# Patient Record
Sex: MALE | Race: WHITE | Employment: OTHER | URBAN - NONMETROPOLITAN AREA
[De-identification: names, ages, dates, MRNs, and addresses within clinical notes are randomized per-mention and may not be internally consistent; named-entity substitution may affect disease eponyms.]

---

## 2017-02-20 DIAGNOSIS — R53.1 WEAKNESS: ICD-10-CM

## 2017-03-31 RX ORDER — TAMSULOSIN HYDROCHLORIDE 0.4 MG/1
CAPSULE ORAL
Qty: 30 CAPSULE | Refills: 5 | Status: SHIPPED | OUTPATIENT
Start: 2017-03-31 | End: 2017-08-25 | Stop reason: SDUPTHER

## 2017-05-08 ENCOUNTER — OFFICE VISIT (OUTPATIENT)
Dept: FAMILY MEDICINE CLINIC | Age: 82
End: 2017-05-08
Payer: MEDICARE

## 2017-05-08 VITALS
RESPIRATION RATE: 20 BRPM | WEIGHT: 146 LBS | BODY MASS INDEX: 20.9 KG/M2 | HEIGHT: 70 IN | SYSTOLIC BLOOD PRESSURE: 132 MMHG | TEMPERATURE: 98.9 F | HEART RATE: 61 BPM | DIASTOLIC BLOOD PRESSURE: 52 MMHG | OXYGEN SATURATION: 99 %

## 2017-05-08 DIAGNOSIS — I48.0 PAROXYSMAL ATRIAL FIBRILLATION (HCC): ICD-10-CM

## 2017-05-08 DIAGNOSIS — H91.93 HOH (HARD OF HEARING), BILATERAL: ICD-10-CM

## 2017-05-08 DIAGNOSIS — R55 SYNCOPE, UNSPECIFIED SYNCOPE TYPE: ICD-10-CM

## 2017-05-08 DIAGNOSIS — C44.90 SKIN CANCER: ICD-10-CM

## 2017-05-08 DIAGNOSIS — D50.9 IRON DEFICIENCY ANEMIA, UNSPECIFIED IRON DEFICIENCY ANEMIA TYPE: ICD-10-CM

## 2017-05-08 DIAGNOSIS — R42 DIZZINESS: ICD-10-CM

## 2017-05-08 DIAGNOSIS — Z92.3 S/P RADIOTHERAPY: ICD-10-CM

## 2017-05-08 PROCEDURE — 99214 OFFICE O/P EST MOD 30 MIN: CPT | Performed by: FAMILY MEDICINE

## 2017-05-08 PROCEDURE — 1036F TOBACCO NON-USER: CPT | Performed by: FAMILY MEDICINE

## 2017-05-08 PROCEDURE — G8419 CALC BMI OUT NRM PARAM NOF/U: HCPCS | Performed by: FAMILY MEDICINE

## 2017-05-08 PROCEDURE — 4040F PNEUMOC VAC/ADMIN/RCVD: CPT | Performed by: FAMILY MEDICINE

## 2017-05-08 PROCEDURE — G8427 DOCREV CUR MEDS BY ELIG CLIN: HCPCS | Performed by: FAMILY MEDICINE

## 2017-05-08 PROCEDURE — 1123F ACP DISCUSS/DSCN MKR DOCD: CPT | Performed by: FAMILY MEDICINE

## 2017-05-08 ASSESSMENT — ENCOUNTER SYMPTOMS
EYES NEGATIVE: 1
NAUSEA: 0
WHEEZING: 0
CHEST TIGHTNESS: 0
SHORTNESS OF BREATH: 0
ALLERGIC/IMMUNOLOGIC NEGATIVE: 1
BLOOD IN STOOL: 0
CONSTIPATION: 0
VOMITING: 0
DIARRHEA: 0

## 2017-05-09 ENCOUNTER — TELEPHONE (OUTPATIENT)
Dept: FAMILY MEDICINE CLINIC | Age: 82
End: 2017-05-09

## 2017-06-27 ENCOUNTER — OFFICE VISIT (OUTPATIENT)
Dept: FAMILY MEDICINE CLINIC | Age: 82
End: 2017-06-27
Payer: MEDICARE

## 2017-06-27 VITALS
SYSTOLIC BLOOD PRESSURE: 118 MMHG | HEIGHT: 72 IN | TEMPERATURE: 98.3 F | OXYGEN SATURATION: 98 % | BODY MASS INDEX: 19.91 KG/M2 | HEART RATE: 67 BPM | WEIGHT: 147 LBS | DIASTOLIC BLOOD PRESSURE: 60 MMHG | RESPIRATION RATE: 16 BRPM

## 2017-06-27 DIAGNOSIS — H91.93 HOH (HARD OF HEARING), BILATERAL: ICD-10-CM

## 2017-06-27 DIAGNOSIS — N40.1 BENIGN NON-NODULAR PROSTATIC HYPERPLASIA WITH LOWER URINARY TRACT SYMPTOMS: ICD-10-CM
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
## 2017-06-27 DIAGNOSIS — E87.1 SODIUM (NA) DEFICIENCY: ICD-10-CM

## 2017-06-27 DIAGNOSIS — D50.8 OTHER IRON DEFICIENCY ANEMIA: ICD-10-CM

## 2017-06-27 DIAGNOSIS — S22.42XD CLOSED FRACTURE OF MULTIPLE RIBS OF LEFT SIDE WITH ROUTINE HEALING, SUBSEQUENT ENCOUNTER: ICD-10-CM

## 2017-06-27 DIAGNOSIS — I10 ESSENTIAL HYPERTENSION: ICD-10-CM

## 2017-06-27 DIAGNOSIS — K59.00 CONSTIPATION, UNSPECIFIED CONSTIPATION TYPE: ICD-10-CM

## 2017-06-27 DIAGNOSIS — S50.02XA LEFT ELBOW CONTUSION: ICD-10-CM

## 2017-06-27 DIAGNOSIS — I48.0 PAROXYSMAL ATRIAL FIBRILLATION (HCC): ICD-10-CM

## 2017-06-27 PROBLEM — S22.39XA FRACTURE OF RIB: Status: ACTIVE | Noted: 2017-06-27
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
## 2017-06-27 LAB
ANION GAP SERPL CALCULATED.3IONS-SCNC: 14 MMOL/L (ref 7–19)
BUN BLDV-MCNC: 8 MG/DL (ref 8–23)
CALCIUM SERPL-MCNC: 8.9 MG/DL (ref 8.2–9.6)
CHLORIDE BLD-SCNC: 95 MMOL/L (ref 98–111)
CO2: 24 MMOL/L (ref 22–29)
CREAT SERPL-MCNC: 0.6 MG/DL (ref 0.5–1.2)
GFR NON-AFRICAN AMERICAN: >60
GLUCOSE BLD-MCNC: 98 MG/DL (ref 74–109)
HCT VFR BLD CALC: 31 % (ref 42–52)
HEMOGLOBIN: 10.4 G/DL (ref 14–18)
MCH RBC QN AUTO: 33.7 PG (ref 27–31)
MCHC RBC AUTO-ENTMCNC: 33.5 G/DL (ref 33–37)
MCV RBC AUTO: 100.3 FL (ref 80–94)
PDW BLD-RTO: 13.2 % (ref 11.5–14.5)
PLATELET # BLD: 227 K/UL (ref 130–400)
PMV BLD AUTO: 9 FL (ref 9.4–12.4)
POTASSIUM SERPL-SCNC: 4.6 MMOL/L (ref 3.5–5)
RBC # BLD: 3.09 M/UL (ref 4.7–6.1)
SODIUM BLD-SCNC: 133 MMOL/L (ref 136–145)
WBC # BLD: 4.3 K/UL (ref 4.8–10.8)

## 2017-06-27 PROCEDURE — 1036F TOBACCO NON-USER: CPT | Performed by: FAMILY MEDICINE

## 2017-06-27 PROCEDURE — 1123F ACP DISCUSS/DSCN MKR DOCD: CPT | Performed by: FAMILY MEDICINE

## 2017-06-27 PROCEDURE — G8427 DOCREV CUR MEDS BY ELIG CLIN: HCPCS | Performed by: FAMILY MEDICINE

## 2017-06-27 PROCEDURE — G8420 CALC BMI NORM PARAMETERS: HCPCS | Performed by: FAMILY MEDICINE

## 2017-06-27 PROCEDURE — 99214 OFFICE O/P EST MOD 30 MIN: CPT | Performed by: FAMILY MEDICINE

## 2017-06-27 PROCEDURE — 4040F PNEUMOC VAC/ADMIN/RCVD: CPT | Performed by: FAMILY MEDICINE

## 2017-06-27 RX ORDER — AMLODIPINE BESYLATE 10 MG/1
10 TABLET ORAL DAILY
COMMUNITY
End: 2017-07-17 | Stop reason: SDUPTHER

## 2017-06-27 RX ORDER — DOCUSATE SODIUM 100 MG/1
100 CAPSULE, LIQUID FILLED ORAL 2 TIMES DAILY
COMMUNITY
End: 2017-11-01 | Stop reason: ALTCHOICE

## 2017-06-27 ASSESSMENT — ENCOUNTER SYMPTOMS
BLOOD IN STOOL: 0
EYES NEGATIVE: 1
ALLERGIC/IMMUNOLOGIC NEGATIVE: 1
NAUSEA: 0
WHEEZING: 0
SHORTNESS OF BREATH: 0
CHEST TIGHTNESS: 0
VOMITING: 0
CONSTIPATION: 0
DIARRHEA: 0

## 2017-07-07 ENCOUNTER — TELEPHONE (OUTPATIENT)
Dept: FAMILY MEDICINE CLINIC | Age: 82
End: 2017-07-07

## 2017-07-07 DIAGNOSIS — E87.1 HYPONATREMIA: Primary | ICD-10-CM

## 2017-07-17 DIAGNOSIS — I10 ESSENTIAL HYPERTENSION: ICD-10-CM

## 2017-07-18 RX ORDER — AMLODIPINE BESYLATE 10 MG/1
TABLET ORAL
Qty: 30 TABLET | Refills: 5 | Status: SHIPPED | OUTPATIENT
Start: 2017-07-18 | End: 2017-09-19 | Stop reason: SDUPTHER

## 2017-07-27 ENCOUNTER — OFFICE VISIT (OUTPATIENT)
Dept: FAMILY MEDICINE CLINIC | Age: 82
End: 2017-07-27
Payer: MEDICARE

## 2017-07-27 VITALS
BODY MASS INDEX: 20.76 KG/M2 | HEART RATE: 94 BPM | OXYGEN SATURATION: 97 % | TEMPERATURE: 99 F | RESPIRATION RATE: 16 BRPM | SYSTOLIC BLOOD PRESSURE: 114 MMHG | HEIGHT: 70 IN | WEIGHT: 145 LBS | DIASTOLIC BLOOD PRESSURE: 64 MMHG

## 2017-07-27 DIAGNOSIS — E87.1 HYPONATREMIA: ICD-10-CM

## 2017-07-27 DIAGNOSIS — H35.30 MACULAR DEGENERATION: ICD-10-CM

## 2017-07-27 DIAGNOSIS — R60.0 PEDAL EDEMA: ICD-10-CM

## 2017-07-27 DIAGNOSIS — D50.8 OTHER IRON DEFICIENCY ANEMIA: ICD-10-CM

## 2017-07-27 DIAGNOSIS — N40.1 BENIGN NON-NODULAR PROSTATIC HYPERPLASIA WITH LOWER URINARY TRACT SYMPTOMS: ICD-10-CM

## 2017-07-27 DIAGNOSIS — I10 ESSENTIAL HYPERTENSION: ICD-10-CM

## 2017-07-27 DIAGNOSIS — I48.0 PAROXYSMAL ATRIAL FIBRILLATION (HCC): ICD-10-CM

## 2017-07-27 DIAGNOSIS — H91.93 HOH (HARD OF HEARING), BILATERAL: ICD-10-CM

## 2017-07-27 LAB
ANION GAP SERPL CALCULATED.3IONS-SCNC: 10 MMOL/L (ref 7–19)
BUN BLDV-MCNC: 8 MG/DL (ref 8–23)
CALCIUM SERPL-MCNC: 9.2 MG/DL (ref 8.2–9.6)
CHLORIDE BLD-SCNC: 96 MMOL/L (ref 98–111)
CO2: 26 MMOL/L (ref 22–29)
CREAT SERPL-MCNC: 0.4 MG/DL (ref 0.5–1.2)
GFR NON-AFRICAN AMERICAN: >60
GLUCOSE BLD-MCNC: 89 MG/DL (ref 74–109)
HCT VFR BLD CALC: 31.6 % (ref 42–52)
HEMOGLOBIN: 10.9 G/DL (ref 14–18)
IRON: 99 UG/DL (ref 59–158)
MCH RBC QN AUTO: 34.9 PG (ref 27–31)
MCHC RBC AUTO-ENTMCNC: 34.5 G/DL (ref 33–37)
MCV RBC AUTO: 101.3 FL (ref 80–94)
PDW BLD-RTO: 13.4 % (ref 11.5–14.5)
PLATELET # BLD: 193 K/UL (ref 130–400)
PMV BLD AUTO: 9.1 FL (ref 9.4–12.4)
POTASSIUM SERPL-SCNC: 4.5 MMOL/L (ref 3.5–5)
RBC # BLD: 3.12 M/UL (ref 4.7–6.1)
SODIUM BLD-SCNC: 132 MMOL/L (ref 136–145)
WBC # BLD: 3.8 K/UL (ref 4.8–10.8)

## 2017-07-27 PROCEDURE — 99214 OFFICE O/P EST MOD 30 MIN: CPT | Performed by: FAMILY MEDICINE

## 2017-07-27 PROCEDURE — 1036F TOBACCO NON-USER: CPT | Performed by: FAMILY MEDICINE

## 2017-07-27 PROCEDURE — G8427 DOCREV CUR MEDS BY ELIG CLIN: HCPCS | Performed by: FAMILY MEDICINE

## 2017-07-27 PROCEDURE — 1123F ACP DISCUSS/DSCN MKR DOCD: CPT | Performed by: FAMILY MEDICINE

## 2017-07-27 PROCEDURE — 4040F PNEUMOC VAC/ADMIN/RCVD: CPT | Performed by: FAMILY MEDICINE

## 2017-07-27 PROCEDURE — G8420 CALC BMI NORM PARAMETERS: HCPCS | Performed by: FAMILY MEDICINE

## 2017-07-27 RX ORDER — FUROSEMIDE 40 MG/1
40 TABLET ORAL DAILY PRN
Status: ON HOLD | COMMUNITY
End: 2017-11-21 | Stop reason: HOSPADM

## 2017-07-27 ASSESSMENT — ENCOUNTER SYMPTOMS
DIARRHEA: 0
WHEEZING: 0
NAUSEA: 0
CHEST TIGHTNESS: 0
ALLERGIC/IMMUNOLOGIC NEGATIVE: 1
EYES NEGATIVE: 1
BLOOD IN STOOL: 0
VOMITING: 0
CONSTIPATION: 0
SHORTNESS OF BREATH: 0

## 2017-07-31 ENCOUNTER — TELEPHONE (OUTPATIENT)
Dept: FAMILY MEDICINE CLINIC | Age: 82
End: 2017-07-31

## 2017-08-01 ENCOUNTER — TELEPHONE (OUTPATIENT)
Dept: FAMILY MEDICINE CLINIC | Age: 82
End: 2017-08-01

## 2017-08-01 RX ORDER — MONTELUKAST SODIUM 10 MG/1
10 TABLET ORAL DAILY
Qty: 30 TABLET | Refills: 5 | Status: SHIPPED | OUTPATIENT
Start: 2017-08-01

## 2017-08-25 RX ORDER — TAMSULOSIN HYDROCHLORIDE 0.4 MG/1
CAPSULE ORAL
Qty: 30 CAPSULE | Refills: 4 | Status: SHIPPED | OUTPATIENT
Start: 2017-08-25 | End: 2017-11-01 | Stop reason: ALTCHOICE

## 2017-09-11 ENCOUNTER — TELEPHONE (OUTPATIENT)
Dept: FAMILY MEDICINE CLINIC | Age: 82
End: 2017-09-11

## 2017-09-12 ENCOUNTER — OFFICE VISIT (OUTPATIENT)
Dept: FAMILY MEDICINE CLINIC | Age: 82
End: 2017-09-12
Payer: MEDICARE

## 2017-09-12 VITALS
HEART RATE: 60 BPM | SYSTOLIC BLOOD PRESSURE: 132 MMHG | HEIGHT: 72 IN | DIASTOLIC BLOOD PRESSURE: 64 MMHG | WEIGHT: 145 LBS | BODY MASS INDEX: 19.64 KG/M2 | OXYGEN SATURATION: 97 %

## 2017-09-12 DIAGNOSIS — I95.9 HYPOTENSION, UNSPECIFIED HYPOTENSION TYPE: ICD-10-CM

## 2017-09-12 DIAGNOSIS — E86.0 DEHYDRATION: ICD-10-CM

## 2017-09-12 DIAGNOSIS — R05.9 COUGH: ICD-10-CM

## 2017-09-12 DIAGNOSIS — I10 ESSENTIAL HYPERTENSION: ICD-10-CM

## 2017-09-12 DIAGNOSIS — R29.898 WEAKNESS OF BOTH LOWER EXTREMITIES: ICD-10-CM

## 2017-09-12 DIAGNOSIS — J40 BRONCHITIS: ICD-10-CM

## 2017-09-12 PROCEDURE — G8420 CALC BMI NORM PARAMETERS: HCPCS | Performed by: FAMILY MEDICINE

## 2017-09-12 PROCEDURE — 99214 OFFICE O/P EST MOD 30 MIN: CPT | Performed by: FAMILY MEDICINE

## 2017-09-12 PROCEDURE — 4040F PNEUMOC VAC/ADMIN/RCVD: CPT | Performed by: FAMILY MEDICINE

## 2017-09-12 PROCEDURE — 1123F ACP DISCUSS/DSCN MKR DOCD: CPT | Performed by: FAMILY MEDICINE

## 2017-09-12 PROCEDURE — 96372 THER/PROPH/DIAG INJ SC/IM: CPT | Performed by: FAMILY MEDICINE

## 2017-09-12 PROCEDURE — G8427 DOCREV CUR MEDS BY ELIG CLIN: HCPCS | Performed by: FAMILY MEDICINE

## 2017-09-12 PROCEDURE — 1036F TOBACCO NON-USER: CPT | Performed by: FAMILY MEDICINE

## 2017-09-12 RX ORDER — TRIAMCINOLONE ACETONIDE 40 MG/ML
80 INJECTION, SUSPENSION INTRA-ARTICULAR; INTRAMUSCULAR ONCE
Status: COMPLETED | OUTPATIENT
Start: 2017-09-12 | End: 2017-09-12

## 2017-09-12 RX ORDER — CEFDINIR 300 MG/1
300 CAPSULE ORAL 2 TIMES DAILY
COMMUNITY
Start: 2017-09-10 | End: 2017-09-15

## 2017-09-12 ASSESSMENT — ENCOUNTER SYMPTOMS
CHEST TIGHTNESS: 0
ALLERGIC/IMMUNOLOGIC NEGATIVE: 1
VOMITING: 0
WHEEZING: 0
EYES NEGATIVE: 1
BLOOD IN STOOL: 0
DIARRHEA: 0
CONSTIPATION: 0
COUGH: 1
SHORTNESS OF BREATH: 0
NAUSEA: 0

## 2017-09-19 ENCOUNTER — OFFICE VISIT (OUTPATIENT)
Dept: FAMILY MEDICINE CLINIC | Age: 82
End: 2017-09-19
Payer: MEDICARE

## 2017-09-19 VITALS
OXYGEN SATURATION: 97 % | HEART RATE: 66 BPM | RESPIRATION RATE: 14 BRPM | TEMPERATURE: 98.9 F | SYSTOLIC BLOOD PRESSURE: 120 MMHG | BODY MASS INDEX: 20.19 KG/M2 | WEIGHT: 141 LBS | HEIGHT: 70 IN | DIASTOLIC BLOOD PRESSURE: 62 MMHG

## 2017-09-19 DIAGNOSIS — I10 ESSENTIAL HYPERTENSION: ICD-10-CM

## 2017-09-19 DIAGNOSIS — I48.0 PAROXYSMAL ATRIAL FIBRILLATION (HCC): ICD-10-CM

## 2017-09-19 DIAGNOSIS — J40 BRONCHITIS: ICD-10-CM

## 2017-09-19 DIAGNOSIS — C44.90 SKIN CANCER: ICD-10-CM

## 2017-09-19 DIAGNOSIS — E86.0 DEHYDRATION: ICD-10-CM

## 2017-09-19 PROCEDURE — G8420 CALC BMI NORM PARAMETERS: HCPCS | Performed by: FAMILY MEDICINE

## 2017-09-19 PROCEDURE — 99214 OFFICE O/P EST MOD 30 MIN: CPT | Performed by: FAMILY MEDICINE

## 2017-09-19 PROCEDURE — G8427 DOCREV CUR MEDS BY ELIG CLIN: HCPCS | Performed by: FAMILY MEDICINE

## 2017-09-19 PROCEDURE — 1123F ACP DISCUSS/DSCN MKR DOCD: CPT | Performed by: FAMILY MEDICINE

## 2017-09-19 PROCEDURE — 4040F PNEUMOC VAC/ADMIN/RCVD: CPT | Performed by: FAMILY MEDICINE

## 2017-09-19 PROCEDURE — 1036F TOBACCO NON-USER: CPT | Performed by: FAMILY MEDICINE

## 2017-09-19 RX ORDER — AMLODIPINE BESYLATE 10 MG/1
5 TABLET ORAL DAILY
Qty: 30 TABLET | Refills: 5 | Status: SHIPPED | OUTPATIENT
Start: 2017-09-19 | End: 2017-11-01 | Stop reason: ALTCHOICE

## 2017-09-19 ASSESSMENT — ENCOUNTER SYMPTOMS
GASTROINTESTINAL NEGATIVE: 1
ALLERGIC/IMMUNOLOGIC NEGATIVE: 1
COUGH: 1
EYES NEGATIVE: 1

## 2017-10-16 ENCOUNTER — OFFICE VISIT (OUTPATIENT)
Dept: FAMILY MEDICINE CLINIC | Age: 82
End: 2017-10-16
Payer: MEDICARE

## 2017-10-16 VITALS
BODY MASS INDEX: 22.33 KG/M2 | SYSTOLIC BLOOD PRESSURE: 104 MMHG | TEMPERATURE: 96.4 F | HEART RATE: 76 BPM | RESPIRATION RATE: 16 BRPM | WEIGHT: 156 LBS | HEIGHT: 70 IN | DIASTOLIC BLOOD PRESSURE: 56 MMHG

## 2017-10-16 DIAGNOSIS — H91.90 HEARING LOSS, UNSPECIFIED HEARING LOSS TYPE, UNSPECIFIED LATERALITY: ICD-10-CM

## 2017-10-16 DIAGNOSIS — E86.0 DEHYDRATION: ICD-10-CM

## 2017-10-16 DIAGNOSIS — I10 ESSENTIAL HYPERTENSION: ICD-10-CM

## 2017-10-16 DIAGNOSIS — E87.1 SODIUM (NA) DEFICIENCY: ICD-10-CM

## 2017-10-16 DIAGNOSIS — I48.0 PAROXYSMAL ATRIAL FIBRILLATION (HCC): ICD-10-CM

## 2017-10-16 LAB
ANION GAP SERPL CALCULATED.3IONS-SCNC: 11 MMOL/L (ref 7–19)
BUN BLDV-MCNC: 8 MG/DL (ref 8–23)
CALCIUM SERPL-MCNC: 8.7 MG/DL (ref 8.2–9.6)
CHLORIDE BLD-SCNC: 95 MMOL/L (ref 98–111)
CO2: 27 MMOL/L (ref 22–29)
CREAT SERPL-MCNC: 0.6 MG/DL (ref 0.5–1.2)
GFR NON-AFRICAN AMERICAN: >60
GLUCOSE BLD-MCNC: 114 MG/DL (ref 74–109)
POTASSIUM SERPL-SCNC: 4.6 MMOL/L (ref 3.5–5)
SODIUM BLD-SCNC: 133 MMOL/L (ref 136–145)

## 2017-10-16 PROCEDURE — G8427 DOCREV CUR MEDS BY ELIG CLIN: HCPCS | Performed by: FAMILY MEDICINE

## 2017-10-16 PROCEDURE — G8420 CALC BMI NORM PARAMETERS: HCPCS | Performed by: FAMILY MEDICINE

## 2017-10-16 PROCEDURE — 4040F PNEUMOC VAC/ADMIN/RCVD: CPT | Performed by: FAMILY MEDICINE

## 2017-10-16 PROCEDURE — 1036F TOBACCO NON-USER: CPT | Performed by: FAMILY MEDICINE

## 2017-10-16 PROCEDURE — G8484 FLU IMMUNIZE NO ADMIN: HCPCS | Performed by: FAMILY MEDICINE

## 2017-10-16 PROCEDURE — 1123F ACP DISCUSS/DSCN MKR DOCD: CPT | Performed by: FAMILY MEDICINE

## 2017-10-16 PROCEDURE — 99214 OFFICE O/P EST MOD 30 MIN: CPT | Performed by: FAMILY MEDICINE

## 2017-10-16 RX ORDER — DIGOXIN 125 MCG
187.5 TABLET ORAL DAILY
Qty: 30 TABLET | Refills: 3 | Status: ON HOLD
Start: 2017-10-16 | End: 2017-11-21 | Stop reason: HOSPADM

## 2017-10-16 ASSESSMENT — ENCOUNTER SYMPTOMS
CONSTIPATION: 0
BLOOD IN STOOL: 0
SHORTNESS OF BREATH: 0
CHEST TIGHTNESS: 0
DIARRHEA: 0
ABDOMINAL PAIN: 0
NAUSEA: 0
WHEEZING: 0
COUGH: 0

## 2017-10-16 NOTE — PROGRESS NOTES
Subjective:      Patient ID: Holly Olson is a 80 y.o. male. HPI Patient here for 2 week follow up of bronchitis. States is doing well. The patient who is with his son today relates that he is doing much better. He denies any cough whatsoever. His lungs were totally clear upon auscultation. At his last visit, it was noted that his blood pressure was quite low and he was having symptoms of hypo-tension. We did put an IV in place and give him some fluids at that time. Also, it was noted that his sodium was a bit low and we're going to check on the status of that today with a BMP on his way out. We did cut the Norvasc 10 mg daily down to one half of that amount or 5 mg daily. Blood pressure remains somewhat low today at 104/56. He does not have the same symptoms that he had previously. His Lasix is taken at 40 mg by mouth twice a day and we may have to cut back on that as well. He does have a history of profound hearing loss which remains unchanged currently. Patient does have an issue with history of cardiac dysrhythmia. He has been noted to have prior history of paroxysmal atrial fibrillation. He is maintained on Multaq 400 mg and takes one by mouth twice a day. He also does take an 81 mg aspirin daily as well. The patient is on Lanoxin 0.125 mg and takes of 1-1/2 pills by mouth daily. Other medications the patient is previously taken have remained unchanged. We will be ordering a BMP on the patient and let him know about the results thereof by phone. We will be seeing him back in 30 days. He will increase his salt intake as likely that we'll correct his blood pressure, his sodium will be elevated and his state of hydration should improve. Review of Systems   Constitutional: Negative for chills, fatigue and fever. Respiratory: Negative for cough, chest tightness, shortness of breath and wheezing. Cardiovascular: Negative for chest pain, palpitations and leg swelling.    Gastrointestinal: Negative for abdominal pain, blood in stool, constipation, diarrhea and nausea. Genitourinary: Negative for hematuria. Psychiatric/Behavioral: Negative for self-injury and suicidal ideas. Objective:   Physical Exam   Constitutional: He is oriented to person, place, and time. He appears well-developed and well-nourished. HENT:   Head: Normocephalic and atraumatic. Right Ear: External ear normal.   Left Ear: External ear normal.   Nose: Nose normal.   Mucous membranes appear slightly dry indicative of some element of dehydration. The patient certainly does not appear as dry as he was at his last visit at which time we did administer IV fluids per IV site. Significant hearing loss is noted bilaterally without significant change being observed. Eyes: Conjunctivae and EOM are normal. Pupils are equal, round, and reactive to light. Neck: Normal range of motion. Neck supple. Cardiovascular: Normal rate, regular rhythm, S1 normal, S2 normal, normal heart sounds, intact distal pulses and normal pulses. Pulmonary/Chest: Effort normal and breath sounds normal. No apnea. Abdominal: Soft. Normal appearance. Musculoskeletal: Normal range of motion. Neurological: He is alert and oriented to person, place, and time. He has normal strength and normal reflexes. Skin: Skin is warm, dry and intact. Psychiatric: He has a normal mood and affect. His speech is normal and behavior is normal. Judgment and thought content normal. Cognition and memory are normal.   Nursing note and vitals reviewed. Assessment:      1. Dehydration     2. Essential hypertension     3. Sodium (Na) deficiency  Basic Metabolic Panel   4. Paroxysmal atrial fibrillation (HCC)     5. Hearing loss, unspecified hearing loss type, unspecified laterality             Plan:      I am having Mr. Lory Fabian maintain his :   Outpatient Prescriptions Marked as Taking for the 10/16/17 encounter (Office Visit) with Annel Encarnacion MD

## 2017-10-18 ENCOUNTER — TELEPHONE (OUTPATIENT)
Dept: FAMILY MEDICINE CLINIC | Age: 82
End: 2017-10-18

## 2017-11-01 ENCOUNTER — OFFICE VISIT (OUTPATIENT)
Dept: FAMILY MEDICINE CLINIC | Age: 82
End: 2017-11-01
Payer: MEDICARE

## 2017-11-01 VITALS
BODY MASS INDEX: 22.38 KG/M2 | SYSTOLIC BLOOD PRESSURE: 110 MMHG | HEART RATE: 68 BPM | OXYGEN SATURATION: 91 % | WEIGHT: 156 LBS | TEMPERATURE: 98 F | DIASTOLIC BLOOD PRESSURE: 60 MMHG

## 2017-11-01 DIAGNOSIS — R35.0 URINARY FREQUENCY: ICD-10-CM

## 2017-11-01 DIAGNOSIS — I48.0 PAROXYSMAL ATRIAL FIBRILLATION (HCC): ICD-10-CM

## 2017-11-01 DIAGNOSIS — R29.6 FREQUENT FALLS: ICD-10-CM

## 2017-11-01 DIAGNOSIS — S50.01XA CONTUSION OF RIGHT ELBOW, INITIAL ENCOUNTER: ICD-10-CM

## 2017-11-01 DIAGNOSIS — N39.0 URINARY TRACT INFECTION WITHOUT HEMATURIA, SITE UNSPECIFIED: ICD-10-CM

## 2017-11-01 DIAGNOSIS — H91.13 PRESBYCUSIS OF BOTH EARS: ICD-10-CM

## 2017-11-01 DIAGNOSIS — I10 ESSENTIAL HYPERTENSION: ICD-10-CM

## 2017-11-01 LAB
BILIRUBIN URINE: NEGATIVE
BLOOD, URINE: ABNORMAL
CLARITY: ABNORMAL
COLOR: ABNORMAL
GLUCOSE URINE: NEGATIVE MG/DL
KETONES, URINE: NEGATIVE MG/DL
LEUKOCYTE ESTERASE, URINE: ABNORMAL
NITRITE, URINE: POSITIVE
PH UA: 6
PROTEIN UA: 100 MG/DL
SPECIFIC GRAVITY UA: >=1.03
URINE TYPE: ABNORMAL
UROBILINOGEN, URINE: 1 E.U./DL

## 2017-11-01 PROCEDURE — G8420 CALC BMI NORM PARAMETERS: HCPCS | Performed by: FAMILY MEDICINE

## 2017-11-01 PROCEDURE — G8427 DOCREV CUR MEDS BY ELIG CLIN: HCPCS | Performed by: FAMILY MEDICINE

## 2017-11-01 PROCEDURE — 1036F TOBACCO NON-USER: CPT | Performed by: FAMILY MEDICINE

## 2017-11-01 PROCEDURE — G8484 FLU IMMUNIZE NO ADMIN: HCPCS | Performed by: FAMILY MEDICINE

## 2017-11-01 PROCEDURE — 4040F PNEUMOC VAC/ADMIN/RCVD: CPT | Performed by: FAMILY MEDICINE

## 2017-11-01 PROCEDURE — 99214 OFFICE O/P EST MOD 30 MIN: CPT | Performed by: FAMILY MEDICINE

## 2017-11-01 PROCEDURE — 1123F ACP DISCUSS/DSCN MKR DOCD: CPT | Performed by: FAMILY MEDICINE

## 2017-11-01 RX ORDER — TAMSULOSIN HYDROCHLORIDE 0.4 MG/1
0.4 CAPSULE ORAL 2 TIMES DAILY
Qty: 60 CAPSULE | Refills: 5 | Status: ON HOLD | OUTPATIENT
Start: 2017-11-01 | End: 2017-11-21 | Stop reason: HOSPADM

## 2017-11-01 RX ORDER — LEVOFLOXACIN 250 MG/1
250 TABLET ORAL DAILY
Qty: 10 TABLET | Refills: 0 | Status: SHIPPED | OUTPATIENT
Start: 2017-11-01 | End: 2017-11-11

## 2017-11-01 ASSESSMENT — ENCOUNTER SYMPTOMS
EYES NEGATIVE: 1
ALLERGIC/IMMUNOLOGIC NEGATIVE: 1
DIARRHEA: 1
RESPIRATORY NEGATIVE: 1

## 2017-11-01 NOTE — PROGRESS NOTES
is oriented to person, place, and time. He appears well-developed. Slender body habitus is noted as usual with the significant component of pallor. He does utilize a walker to facilitate ambulation. He does seem to be getting around fairly well today. The son did point out to us the contusion on the lateral aspect of the right pelvis while he was attending the bathroom matters. The son states that he is doing quite well with regard to this and the contusion on his right elbow. No x-rays were felt to be appropriate at this time. HENT:   Head: Normocephalic and atraumatic. Right Ear: External ear normal.   Left Ear: External ear normal.   Nose: Nose normal.   Mouth/Throat: Oropharynx is clear and moist.   The patient does have significant hearing deficit bilaterally and does utilize hearing aids. This remains unchanged however. Eyes: Conjunctivae and EOM are normal. Pupils are equal, round, and reactive to light. Neck: Normal range of motion. Neck supple. Cardiovascular: Normal rate, regular rhythm, S1 normal, S2 normal, normal heart sounds, intact distal pulses and normal pulses. Pulmonary/Chest: Effort normal and breath sounds normal. No apnea. Abdominal: Soft. Normal appearance. Musculoskeletal: Normal range of motion. Neurological: He is alert and oriented to person, place, and time. He has normal strength and normal reflexes. Skin: Skin is warm, dry and intact. There is pallor. The patient is quite pale but this is a chronic condition. Psychiatric: He has a normal mood and affect. His speech is normal and behavior is normal. Judgment and thought content normal. Cognition and memory are normal.   Nursing note and vitals reviewed. Assessment:      1. Urinary frequency  tamsulosin (FLOMAX) 0.4 MG capsule    Urinalysis   2. Frequent falls  External Referral To Home Health   3. Urinary tract infection without hematuria, site unspecified  levofloxacin (LEVAQUIN) 250 MG tablet   4. Contusion of right elbow, initial encounter     5. Paroxysmal atrial fibrillation (HCC)     6. Essential hypertension     7. Presbycusis of both ears             Plan:      I am having Mr. Peralta Base maintain his :   Outpatient Prescriptions Marked as Taking for the 11/1/17 encounter (Office Visit) with Humaira Cardona MD   Medication Sig Dispense Refill    tamsulosin (FLOMAX) 0.4 MG capsule Take 1 capsule by mouth 2 times daily 60 capsule 5    docusate sodium (COLACE) 50 MG capsule Take 1 capsule by mouth 2 times daily 60 capsule 5    levofloxacin (LEVAQUIN) 250 MG tablet Take 1 tablet by mouth daily for 10 days 10 tablet 0    digoxin (LANOXIN) 125 MCG tablet Take 1.5 tablets by mouth daily 30 tablet 3    Lift Chair MISC by Does not apply route 1 each 0    pyrilamine-phenylephrine-dextromethorphan (CODIMAL DM) 5-8.33-10 MG/5ML syrup Take 5 mLs by mouth every 6 hours as needed for Cough 180 mL 0    montelukast (SINGULAIR) 10 MG tablet Take 1 tablet by mouth daily 30 tablet 5    furosemide (LASIX) 40 MG tablet Take 40 mg by mouth 2 times daily      Ferrous Sulfate (IRON) 142 (45 FE) MG TBCR Take 1 tablet by mouth 3 times daily 30 tablet 3    Probiotic Product (PROBIOTIC DAILY PO) Take 1 each by mouth Pt takes a gummy probiotic once a day.  Multiple Vitamins-Minerals (CENTRUM SILVER) TABS Take 0.5 tablets by mouth daily      nitroGLYCERIN (NITROSTAT) 0.4 MG SL tablet Place 0.4 mg under the tongue every 5 minutes as needed for Chest pain      dronedarone hcl (MULTAQ) 400 MG TABS Take 400 mg by mouth 2 times daily (with meals).  aspirin 81 MG tablet Take 81 mg by mouth daily.  bromfenac (PROLENSA) 0.07 % SOLN 1 drop daily.  Each eye     ,Patient states they are no longer utilizing these medication:   Medications Discontinued During This Encounter   Medication Reason    amLODIPine (NORVASC) 10 MG tablet Therapy completed    tamsulosin (FLOMAX) 0.4 MG capsule Alternate therapy    docusate sodium (COLACE) 100 MG capsule Alternate therapy    I have refilled the following medication today:   Requested Prescriptions     Signed Prescriptions Disp Refills    tamsulosin (FLOMAX) 0.4 MG capsule 60 capsule 5     Sig: Take 1 capsule by mouth 2 times daily    docusate sodium (COLACE) 50 MG capsule 60 capsule 5     Sig: Take 1 capsule by mouth 2 times daily    levofloxacin (LEVAQUIN) 250 MG tablet 10 tablet 0     Sig: Take 1 tablet by mouth daily for 10 days   .

## 2017-11-02 PROBLEM — I48.0 PAROXYSMAL ATRIAL FIBRILLATION (HCC): Status: RESOLVED | Noted: 2017-06-27 | Resolved: 2017-11-02

## 2017-11-17 ENCOUNTER — HOSPITAL ENCOUNTER (INPATIENT)
Age: 82
LOS: 4 days | Discharge: NURSING, REHAB OR LONG-TERM CARE | DRG: 682 | End: 2017-11-21
Attending: HOSPITALIST | Admitting: HOSPITALIST
Payer: MEDICARE

## 2017-11-17 ENCOUNTER — APPOINTMENT (OUTPATIENT)
Dept: ULTRASOUND IMAGING | Age: 82
DRG: 682 | End: 2017-11-17
Attending: HOSPITALIST
Payer: MEDICARE

## 2017-11-17 PROBLEM — F03.90 DEMENTIA (HCC): Status: ACTIVE | Noted: 2017-11-17

## 2017-11-17 PROBLEM — R33.8 URINARY RETENTION DUE TO BENIGN PROSTATIC HYPERPLASIA: Status: ACTIVE | Noted: 2017-11-17

## 2017-11-17 PROBLEM — N17.9 AKI (ACUTE KIDNEY INJURY) (HCC): Status: ACTIVE | Noted: 2017-11-17

## 2017-11-17 PROBLEM — N17.9 ACUTE KIDNEY FAILURE (HCC): Status: ACTIVE | Noted: 2017-11-17

## 2017-11-17 PROBLEM — G93.41 ENCEPHALOPATHY, METABOLIC: Status: ACTIVE | Noted: 2017-11-17

## 2017-11-17 PROBLEM — N13.30 BILATERAL HYDRONEPHROSIS: Status: ACTIVE | Noted: 2017-11-17

## 2017-11-17 PROBLEM — K59.00 CONSTIPATION: Status: ACTIVE | Noted: 2017-11-17

## 2017-11-17 PROBLEM — N40.1 URINARY RETENTION DUE TO BENIGN PROSTATIC HYPERPLASIA: Status: ACTIVE | Noted: 2017-11-17

## 2017-11-17 PROBLEM — J18.9 PNEUMONIA OF LEFT LOWER LOBE DUE TO INFECTIOUS ORGANISM: Status: ACTIVE | Noted: 2017-11-17

## 2017-11-17 LAB
ALBUMIN SERPL-MCNC: 3 G/DL (ref 3.5–5.2)
ALP BLD-CCNC: 42 U/L (ref 40–130)
ALT SERPL-CCNC: 26 U/L (ref 5–41)
ANION GAP SERPL CALCULATED.3IONS-SCNC: 18 MMOL/L (ref 7–19)
AST SERPL-CCNC: 43 U/L (ref 5–40)
BILIRUB SERPL-MCNC: 0.7 MG/DL (ref 0.2–1.2)
BILIRUBIN URINE: NEGATIVE
BLOOD, URINE: NEGATIVE
BUN BLDV-MCNC: 45 MG/DL (ref 8–23)
CALCIUM SERPL-MCNC: 8.7 MG/DL (ref 8.2–9.6)
CHLORIDE BLD-SCNC: 95 MMOL/L (ref 98–111)
CLARITY: CLEAR
CO2: 21 MMOL/L (ref 22–29)
COLOR: YELLOW
CREAT SERPL-MCNC: 3.7 MG/DL (ref 0.5–1.2)
CREATININE URINE: 36 MG/DL (ref 4.2–622)
DIGOXIN LEVEL: 1.1 NG/ML (ref 0.6–1.2)
EOSINOPHIL,URINE: NORMAL
GFR NON-AFRICAN AMERICAN: 15
GLUCOSE BLD-MCNC: 131 MG/DL (ref 74–109)
GLUCOSE URINE: NEGATIVE MG/DL
HCT VFR BLD CALC: 25.2 % (ref 42–52)
HEMOGLOBIN: 8.5 G/DL (ref 14–18)
KETONES, URINE: NEGATIVE MG/DL
LEUKOCYTE ESTERASE, URINE: NEGATIVE
MAGNESIUM: 2 MG/DL (ref 1.7–2.3)
MCH RBC QN AUTO: 33.7 PG (ref 27–31)
MCHC RBC AUTO-ENTMCNC: 33.7 G/DL (ref 33–37)
MCV RBC AUTO: 100 FL (ref 80–94)
NITRITE, URINE: NEGATIVE
PARATHYROID HORMONE INTACT: 109.1 PG/ML (ref 15–65)
PDW BLD-RTO: 14.3 % (ref 11.5–14.5)
PH UA: 5.5
PHOSPHORUS: 4.9 MG/DL (ref 2.5–4.5)
PLATELET # BLD: 209 K/UL (ref 130–400)
PMV BLD AUTO: 8.5 FL (ref 9.4–12.4)
POTASSIUM SERPL-SCNC: 3.8 MMOL/L (ref 3.5–5)
PROTEIN PROTEIN: <4 MG/DL (ref 15–45)
PROTEIN UA: NEGATIVE MG/DL
RBC # BLD: 2.52 M/UL (ref 4.7–6.1)
SODIUM BLD-SCNC: 134 MMOL/L (ref 136–145)
SODIUM URINE: 89 MMOL/L
SPECIFIC GRAVITY UA: 1.01
TOTAL PROTEIN: 5.7 G/DL (ref 6.6–8.7)
UREA NITROGEN, UR: 225 MG/DL
URIC ACID, SERUM: 6.3 MG/DL (ref 3.4–7)
UROBILINOGEN, URINE: 0.2 E.U./DL
VITAMIN D 25-HYDROXY: 16.8 NG/ML
WBC # BLD: 7.3 K/UL (ref 4.8–10.8)

## 2017-11-17 PROCEDURE — 80162 ASSAY OF DIGOXIN TOTAL: CPT

## 2017-11-17 PROCEDURE — 87040 BLOOD CULTURE FOR BACTERIA: CPT

## 2017-11-17 PROCEDURE — 84100 ASSAY OF PHOSPHORUS: CPT

## 2017-11-17 PROCEDURE — 82570 ASSAY OF URINE CREATININE: CPT

## 2017-11-17 PROCEDURE — 82306 VITAMIN D 25 HYDROXY: CPT

## 2017-11-17 PROCEDURE — 84156 ASSAY OF PROTEIN URINE: CPT

## 2017-11-17 PROCEDURE — 89050 BODY FLUID CELL COUNT: CPT

## 2017-11-17 PROCEDURE — 1210000000 HC MED SURG R&B

## 2017-11-17 PROCEDURE — 76770 US EXAM ABDO BACK WALL COMP: CPT

## 2017-11-17 PROCEDURE — 84300 ASSAY OF URINE SODIUM: CPT

## 2017-11-17 PROCEDURE — 83970 ASSAY OF PARATHORMONE: CPT

## 2017-11-17 PROCEDURE — 84550 ASSAY OF BLOOD/URIC ACID: CPT

## 2017-11-17 PROCEDURE — 2580000003 HC RX 258: Performed by: INTERNAL MEDICINE

## 2017-11-17 PROCEDURE — 81003 URINALYSIS AUTO W/O SCOPE: CPT

## 2017-11-17 PROCEDURE — 84540 ASSAY OF URINE/UREA-N: CPT

## 2017-11-17 PROCEDURE — 85027 COMPLETE CBC AUTOMATED: CPT

## 2017-11-17 PROCEDURE — 99223 1ST HOSP IP/OBS HIGH 75: CPT | Performed by: HOSPITALIST

## 2017-11-17 PROCEDURE — 36415 COLL VENOUS BLD VENIPUNCTURE: CPT

## 2017-11-17 PROCEDURE — 83735 ASSAY OF MAGNESIUM: CPT

## 2017-11-17 PROCEDURE — 6370000000 HC RX 637 (ALT 250 FOR IP): Performed by: PHYSICIAN ASSISTANT

## 2017-11-17 PROCEDURE — 6360000002 HC RX W HCPCS: Performed by: PHYSICIAN ASSISTANT

## 2017-11-17 PROCEDURE — 80053 COMPREHEN METABOLIC PANEL: CPT

## 2017-11-17 PROCEDURE — 2580000003 HC RX 258: Performed by: PHYSICIAN ASSISTANT

## 2017-11-17 RX ORDER — TAMSULOSIN HYDROCHLORIDE 0.4 MG/1
0.4 CAPSULE ORAL 2 TIMES DAILY
Status: DISCONTINUED | OUTPATIENT
Start: 2017-11-17 | End: 2017-11-18

## 2017-11-17 RX ORDER — FINASTERIDE 1 MG/1
5 TABLET, FILM COATED ORAL DAILY
Status: DISCONTINUED | OUTPATIENT
Start: 2017-11-17 | End: 2017-11-17 | Stop reason: SDUPTHER

## 2017-11-17 RX ORDER — SODIUM CHLORIDE 9 MG/ML
INJECTION, SOLUTION INTRAVENOUS CONTINUOUS
Status: DISCONTINUED | OUTPATIENT
Start: 2017-11-17 | End: 2017-11-18

## 2017-11-17 RX ORDER — SODIUM CHLORIDE 0.9 % (FLUSH) 0.9 %
10 SYRINGE (ML) INJECTION PRN
Status: DISCONTINUED | OUTPATIENT
Start: 2017-11-17 | End: 2017-11-21 | Stop reason: HOSPADM

## 2017-11-17 RX ORDER — ACETAMINOPHEN 500 MG
500 TABLET ORAL EVERY 6 HOURS PRN
COMMUNITY

## 2017-11-17 RX ORDER — ONDANSETRON 2 MG/ML
4 INJECTION INTRAMUSCULAR; INTRAVENOUS EVERY 6 HOURS PRN
Status: DISCONTINUED | OUTPATIENT
Start: 2017-11-17 | End: 2017-11-21 | Stop reason: HOSPADM

## 2017-11-17 RX ORDER — NITROGLYCERIN 0.4 MG/1
0.4 TABLET SUBLINGUAL EVERY 5 MIN PRN
Status: DISCONTINUED | OUTPATIENT
Start: 2017-11-17 | End: 2017-11-21 | Stop reason: HOSPADM

## 2017-11-17 RX ORDER — ACETAMINOPHEN 500 MG
500 TABLET ORAL EVERY 6 HOURS PRN
Status: DISCONTINUED | OUTPATIENT
Start: 2017-11-17 | End: 2017-11-21 | Stop reason: HOSPADM

## 2017-11-17 RX ORDER — FINASTERIDE 5 MG/1
5 TABLET, FILM COATED ORAL DAILY
Status: DISCONTINUED | OUTPATIENT
Start: 2017-11-17 | End: 2017-11-18

## 2017-11-17 RX ORDER — SODIUM CHLORIDE 0.9 % (FLUSH) 0.9 %
10 SYRINGE (ML) INJECTION EVERY 12 HOURS SCHEDULED
Status: DISCONTINUED | OUTPATIENT
Start: 2017-11-17 | End: 2017-11-21 | Stop reason: HOSPADM

## 2017-11-17 RX ORDER — UREA 10 %
140 LOTION (ML) TOPICAL
Status: DISCONTINUED | OUTPATIENT
Start: 2017-11-17 | End: 2017-11-18

## 2017-11-17 RX ORDER — ASPIRIN 81 MG/1
81 TABLET, CHEWABLE ORAL DAILY
Status: DISCONTINUED | OUTPATIENT
Start: 2017-11-17 | End: 2017-11-18

## 2017-11-17 RX ORDER — BROMFENAC 1.03 MG/ML
1 SOLUTION/ DROPS OPHTHALMIC DAILY
Status: DISCONTINUED | OUTPATIENT
Start: 2017-11-17 | End: 2017-11-21 | Stop reason: HOSPADM

## 2017-11-17 RX ORDER — MULTIVIT-MIN/FA/LYCOPEN/LUTEIN .4-300-25
0.5 TABLET ORAL DAILY
Status: DISCONTINUED | OUTPATIENT
Start: 2017-11-17 | End: 2017-11-18

## 2017-11-17 RX ORDER — ACETAMINOPHEN 325 MG/1
650 TABLET ORAL EVERY 4 HOURS PRN
Status: DISCONTINUED | OUTPATIENT
Start: 2017-11-17 | End: 2017-11-21 | Stop reason: HOSPADM

## 2017-11-17 RX ORDER — MONTELUKAST SODIUM 10 MG/1
10 TABLET ORAL DAILY
Status: DISCONTINUED | OUTPATIENT
Start: 2017-11-17 | End: 2017-11-18

## 2017-11-17 RX ORDER — LANOLIN ALCOHOL/MO/W.PET/CERES
CREAM (GRAM) TOPICAL 2 TIMES DAILY
Status: DISCONTINUED | OUTPATIENT
Start: 2017-11-17 | End: 2017-11-21 | Stop reason: HOSPADM

## 2017-11-17 RX ADMIN — SODIUM CHLORIDE: 9 INJECTION, SOLUTION INTRAVENOUS at 14:04

## 2017-11-17 RX ADMIN — CEFTRIAXONE 1 G: 1 INJECTION, SOLUTION INTRAVENOUS at 17:00

## 2017-11-17 RX ADMIN — Medication 0.5 TABLET: at 16:28

## 2017-11-17 RX ADMIN — Medication 10 ML: at 20:57

## 2017-11-17 RX ADMIN — Medication 140 MG: at 17:00

## 2017-11-17 RX ADMIN — FINASTERIDE 5 MG: 5 TABLET, FILM COATED ORAL at 16:28

## 2017-11-17 RX ADMIN — BROMFENAC 1 DROP: 1.03 SOLUTION/ DROPS OPHTHALMIC at 17:00

## 2017-11-17 RX ADMIN — DRONEDARONE 400 MG: 400 TABLET, FILM COATED ORAL at 17:00

## 2017-11-17 RX ADMIN — MONTELUKAST SODIUM 10 MG: 10 TABLET, FILM COATED ORAL at 16:28

## 2017-11-17 RX ADMIN — TAMSULOSIN HYDROCHLORIDE 0.4 MG: 0.4 CAPSULE ORAL at 20:56

## 2017-11-17 RX ADMIN — Medication: at 20:57

## 2017-11-17 RX ADMIN — AZITHROMYCIN MONOHYDRATE 500 MG: 500 INJECTION, POWDER, LYOPHILIZED, FOR SOLUTION INTRAVENOUS at 18:22

## 2017-11-17 NOTE — H&P
[facility] - History & Physical      PCP: Kelsey Gonzalez MD    Date of Admission: 11/17/2017    Date of Service: 11/17/2017    Chief Complaint:  Renal failure    History Of Present Illness: The patient is a 80 y.o. male who presented to [facility] in transfer from [facility] due to worsening renal failure. The patient was admitted on 11/12/17 with AMS, cellulitis, and hyponatremia. He was initially monitored in ICU. He was placed on Vancomycin and Zosyn. He was found to have pneumonia per CT of the chest.  Swallow study was obtained and the patient was placed on a pureed diet. The patient seemed to be improving from an infection standpoint; however, his renal parameters declined. Vancomycin and diuretics were discontinued. Renal parameters continued to decline despite IVF's. He was transferred to our facility for nephrology consultation. The patient does have a PMH significant for BPH. The patient's son is present at the bedside and reports that a rodriguez catheter was attempted at [facility] but was unsuccessful. His abdomen is noted to be distended. Renal ultrasound is obtained upon admission, which revealed hydronephrosis bilaterally. Rodriguez catheter placed with 1350 mL urine output immediately. Past Medical History:        Diagnosis Date    A-fib [facility])     Atrial fibrillation (HCC)     Cancer (HCC)     Chronic kidney disease     COPD (chronic obstructive pulmonary disease) (HCC)     Emphysema of lung (HCC)     History of skin cancer     scalp, sees Dr. Nelda Holstein (hard of hearing)     Macular degeneration     OAB (overactive bladder)     Dr. Orly Segal    Pneumonia of left lower lobe due to infectious organism ([facility] [address] 11/17/2017       Past Surgical History:        Procedure Laterality Date    APPENDECTOMY      CHOLECYSTECTOMY, LAPAROSCOPIC      EYE SURGERY Right 10/15/15    Dr. Patrick Damon.     HEMORRHOID SURGERY      TONSILLECTOMY         Home Medications:  Prior to Admission medications    Medication Sig Start Date End Date Taking? Authorizing Provider   CALCIUM CARBONATE PO Take 500 mg by mouth daily Takes after supper   Yes Historical Provider, MD   PROMETHAZINE-DM PO Take 5 mLs by mouth every 6 hours as needed   Yes Historical Provider, MD   bromfenac (PROLENSA) 0.07 % SOLN 1 drop daily Daily at bedtime   Yes Historical Provider, MD   acetaminophen (TYLENOL) 500 MG tablet Take 500 mg by mouth every 6 hours as needed for Pain   Yes Historical Provider, MD   IRON PO Take 45 mg by mouth 2 times daily   Yes Historical Provider, MD   tamsulosin (FLOMAX) 0.4 MG capsule Take 1 capsule by mouth 2 times daily  Patient taking differently: Take 0.4 mg by mouth daily After supper 11/1/17  Yes Ada Garnett MD   docusate sodium (COLACE) 50 MG capsule Take 1 capsule by mouth 2 times daily 11/1/17  Yes Ada Garnett MD   digoxin (LANOXIN) 125 MCG tablet Take 1.5 tablets by mouth daily  Patient taking differently: Take 187.5 mcg by mouth daily At bedtime 10/16/17  Yes Ada Garnett MD   Probiotic Product (PROBIOTIC DAILY PO) Take 1 each by mouth Pt takes a gummy probiotic once a day. Yes Historical Provider, MD   Multiple Vitamins-Minerals (CENTRUM SILVER) TABS Take 0.5 tablets by mouth daily   Yes Historical Provider, MD   nitroGLYCERIN (NITROSTAT) 0.4 MG SL tablet Place 0.4 mg under the tongue every 5 minutes as needed for Chest pain   Yes Historical Provider, MD   dronedarone hcl (MULTAQ) 400 MG TABS Take 400 mg by mouth 2 times daily (with meals). Yes Historical Provider, MD   aspirin 81 MG tablet Take 81 mg by mouth daily.    Yes Historical Provider, MD   Lift Chair MISC by Does not apply route 9/12/17   Ada Garnett MD   pyrilamine-phenylephrine-dextromethorphan [facility] DM) 8-2.58-71 MG/5ML syrup Take 5 mLs by mouth every 6 hours as needed for Cough 9/12/17   Ada Garnett MD   montelukast (SINGULAIR) 10 MG tablet Take 1 tablet by mouth daily 8/1/17   Walter Ferrell MD   furosemide (LASIX) 40 MG tablet Take 40 mg by mouth daily as needed     Historical Provider, MD   Ferrous Sulfate (IRON) 142 (45 FE) MG TBCR Take 1 tablet by mouth 3 times daily 3/31/16   Walter Ferrell MD   bromfenac (PROLENSA) 0.07 % SOLN 1 drop daily. Each eye    Historical Provider, MD       Allergies:    Latex and Bactrim [sulfamethoxazole-trimethoprim]    Social History:    The patient currently lives at [facility] [address]:   reports that he has quit smoking. He has never used smokeless tobacco.  Alcohol:   reports that he does not drink alcohol. Illicit Drugs: None documented. Family History:      Problem Relation Age of Onset    Dementia Mother     Kidney Disease Father        Review of Systems:   Unable to obtain due to AMS. Physical Examination:  /69   Pulse 81   Temp 99.3 °F (37.4 °C) (Temporal)   Resp 18   SpO2 95%   General appearance: No apparent distress, appears stated age and cooperative. HEENT: Normal cephalic, atraumatic without obvious deformity. Pupils equal, round, and reactive to light. Extra ocular muscles intact. Conjunctivae/corneas clear. Neck: Supple, with full range of motion. No jugular venous distention. Trachea midline. Respiratory:  Normal respiratory effort. Clear to auscultation, bilaterally without Rales/Wheezes/Rhonchi. Diminished breath sounds at bases. Cardiovascular: Irregularly irregular rhythm with normal S1/S2 without murmurs, rubs or gallops. Abdomen: Soft, distended, non tender.  + Bowel sounds. Musculoskeletal: + Edema BLE's. No clubbing or cyanosis. Skin: Buttock wound. Neurologic:  Hard of hearing. Confused. Speech clear. Psychiatric: Alert but confused. Diagnostic Data:  CT Chest:  1. Several areas of airspace consolidation and ground glass opacities in the LLL likely representing pneumonia.   Recommend follow up CT of the chest in 1-2 weeks following treatment to assess [N13.30] 11/17/2017    Pneumonia of left lower lobe due to infectious organism ([facility] [address] [J18.1] 11/17/2017    Encephalopathy, metabolic [O62.86] 03/03/3556    Constipation [K59.00] 11/17/2017    Essential hypertension [I10] 06/27/2017    Benign non-nodular prostatic hyperplasia with lower urinary tract symptoms [N40.1] 06/27/2017    Chronic atrial fibrillation ([facility] [address] [I48.2]        Assessment and Plan:    1. AMADEO - likely secondary to obstructive uropathy. Castro catheter placed. Nephrology consulted. IVF's started. Urine studies. Follow renal parameters. 2. Urinary retention due to BPH - has followed with Dr. Clifford Current in the past.  Castro catheter placed. Continue flomax. Add finasteride. 3. Bilateral hydronephrosis - Castro catheter. 4. Metabolic Encephalopathy - likely multifactorial.  Monitor. 5. Dementia - mild at baseline. 6. Chronic atrial fibrillation - Telemetry. Obtain digoxin level. Continue Multaq. 7. HTN - Controlled. 8. Constipation - Resume colace. Add miralax. 9. Pneumonia - Obtain blood and sputum cultures. IV antibiotics. Chest xray in am.  10. DVT Prophylaxis - Lovenox. Donell Leigh Code    Dr. Makayla Tinoco to see today and provide any further recommendations. Michael Sánchez PA-C  11/17/2017     I have independently interviewed and examined the patient. I have discussed key elements of the care plan with the PA or APRN & agree with the findings and care plan as documented above.   Nancie Goldman MD    CC: Breathing OK, hungry  S: Wants to eat, alert and interactive, transferred for AMADEO complicating Rx of LE cellulitis  O:Vitals: /69   Pulse 81   Temp 99.3 °F (37.4 °C) (Temporal)   Resp 18   SpO2 95%   24HR INTAKE/OUTPUT:    Intake/Output Summary (Last 24 hours) at 11/17/17 1609  Last data filed at 11/17/17 1505   Gross per 24 hour   Intake                0 ml   Output             1350 ml   Net            -1350 ml     General appearance: alert and cooperative with exam  HEENT: atraumatic, eyes with clear conjunctiva and normal lids, pupils and irises normal, external ears and nose are normal, lips normal. Neck without masses, lympadenopathy, bruit, thyroid normal  Lungs: no increased work of breathing, clear to auscultation bilaterally without rales, rhonchi or wheezes  Heart: regular rate and rhythm, S1, S2 normal, no murmur, click, rub or gallop  Abdomen: soft, non-tender; bowel sounds normal; no masses,  no organomegaly  Extremities: extremities normal, atraumatic, no cyanosis 2 +  edema  Neurologic: No focal neurologic deficits, normal sensation, alert and oriented, affect and mood appropriate.   Skin: no rashes, nodules, has mild eczema craque' but no erythema  A/P: AMADEO, will hydrate, minimize nephrotoxins, monitor CR, I/O.

## 2017-11-17 NOTE — PROGRESS NOTES
Subjective :      Joselyn Nuñez is a 80 y.o. male referral for wound eval.   Patient has a non-blanchable wound which is located on the Coccyx. Objective:        Wound:  Coccyx with Stage 1 pressure injury 2cm x 3cm x <0.1cm  Perineal, scrotum and bilateral groins reddened  BLE's dry and excoriation  Bilateral heels blanchable erythema-floating off the bed with pillows    Wound Description: see above  This was originally measured on 11/17/17  Measurements shown are from today's visit:11/17/17      Assessment :      Other: Stage 1 pressure injury to coccyx  Patient 81 yo, LOS 1 day, Jr score documented as 17. Patient with history of dementia, Acute kidney failure, chronic a fib, [place], see complete medical summary for medical history. Plan :      Plan for wound:[employer] dolphin mattress, Eucerin cream for BLE's, mepilex sacral shaped dressing to coccyx, pressure injury prevention.   Dress per physician order: Discussed with Renetta Costello PA-C orders received for Dolphin mattress and Eucerin cream to Ble's    Pressure Ulcer Prevention Protocol reviewed and updated    Jeramy Nguyễn RN 11/17/2017

## 2017-11-17 NOTE — CONSULTS
Nephrology ([address] [facility]) Consult Note      Patient:  Rex Jenkins  YOB: 1922  Date of Service: 11/17/2017  MRN: [id number]   Acct: [de-identified]   Primary Care Physician: Ada Garnett MD  Advance Directive: Full Code  Admit Date: 11/17/2017       Hospital Day: 0  Referring Provider: Noe Barajas, *    Patient independently seen and examined, Chart, Consults, Notes, Operative notes, Labs, Cardiology, and Radiology studies reviewed as able. Subjective:  Rex Jenkins is a 80 y.o. male  whom we were consulted for AMADEO. D/w Shahla Herrera PA-C. Prior normal baseline creat in October. Admitted to [facility] where he was exposed to vancomycin/zosyn combination therapy and diuretics. Subsequently had worsening of renal function and was transferred to [facility] for further evaluation. Family present, assisting with history. Pt poor historian, denying any specific complaints. No NSAIDs/hematuria/dysuria.       Allergies:  Latex and Bactrim [sulfamethoxazole-trimethoprim]    Medicines:  Current Facility-Administered Medications   Medication Dose Route Frequency Provider Last Rate Last Dose    sodium chloride flush 0.9 % injection 10 mL  10 mL Intravenous 2 times per day Lorna Fanning, PA-C        sodium chloride flush 0.9 % injection 10 mL  10 mL Intravenous PRN Lorna Fanning, PA-C        acetaminophen (TYLENOL) tablet 650 mg  650 mg Oral Q4H PRN Lorna Fanning, PA-C        magnesium hydroxide (MILK OF MAGNESIA) 400 MG/5ML suspension 30 mL  30 mL Oral Daily PRN Lorna Fanning, PA-C        ondansetron [facility]) injection 4 mg  4 mg Intravenous Q6H PRN Lorna Fanning, PA-C           Past Medical History:  Past Medical History:   Diagnosis Date    A-fib [facility])     Atrial fibrillation (HCC)     History of skin cancer     scalp, sees Dr. Gissell Stanley (hard of hearing)     Macular degeneration     OAB (overactive bladder)     Dr. Whitney Campos       Past Surgical

## 2017-11-18 ENCOUNTER — APPOINTMENT (OUTPATIENT)
Dept: GENERAL RADIOLOGY | Age: 82
DRG: 682 | End: 2017-11-18
Attending: HOSPITALIST
Payer: MEDICARE

## 2017-11-18 PROBLEM — E87.6 HYPOKALEMIA: Status: ACTIVE | Noted: 2017-11-18

## 2017-11-18 LAB
ANION GAP SERPL CALCULATED.3IONS-SCNC: 12 MMOL/L (ref 7–19)
BUN BLDV-MCNC: 29 MG/DL (ref 8–23)
CALCIUM SERPL-MCNC: 8.2 MG/DL (ref 8.2–9.6)
CHLORIDE BLD-SCNC: 98 MMOL/L (ref 98–111)
CO2: 26 MMOL/L (ref 22–29)
CREAT SERPL-MCNC: 1.6 MG/DL (ref 0.5–1.2)
GFR NON-AFRICAN AMERICAN: 40
GLUCOSE BLD-MCNC: 100 MG/DL (ref 74–109)
POTASSIUM SERPL-SCNC: 3.2 MMOL/L (ref 3.5–5)
SODIUM BLD-SCNC: 136 MMOL/L (ref 136–145)

## 2017-11-18 PROCEDURE — G8997 SWALLOW GOAL STATUS: HCPCS

## 2017-11-18 PROCEDURE — G8996 SWALLOW CURRENT STATUS: HCPCS

## 2017-11-18 PROCEDURE — 6370000000 HC RX 637 (ALT 250 FOR IP): Performed by: PHYSICIAN ASSISTANT

## 2017-11-18 PROCEDURE — 6370000000 HC RX 637 (ALT 250 FOR IP): Performed by: INTERNAL MEDICINE

## 2017-11-18 PROCEDURE — 71020 XR CHEST STANDARD TWO VW: CPT

## 2017-11-18 PROCEDURE — 2580000003 HC RX 258: Performed by: INTERNAL MEDICINE

## 2017-11-18 PROCEDURE — 80048 BASIC METABOLIC PNL TOTAL CA: CPT

## 2017-11-18 PROCEDURE — 6360000002 HC RX W HCPCS: Performed by: PHYSICIAN ASSISTANT

## 2017-11-18 PROCEDURE — 36415 COLL VENOUS BLD VENIPUNCTURE: CPT

## 2017-11-18 PROCEDURE — 94640 AIRWAY INHALATION TREATMENT: CPT

## 2017-11-18 PROCEDURE — 2580000003 HC RX 258: Performed by: PHYSICIAN ASSISTANT

## 2017-11-18 PROCEDURE — 92610 EVALUATE SWALLOWING FUNCTION: CPT

## 2017-11-18 PROCEDURE — 2700000000 HC OXYGEN THERAPY PER DAY

## 2017-11-18 PROCEDURE — 99233 SBSQ HOSP IP/OBS HIGH 50: CPT | Performed by: HOSPITALIST

## 2017-11-18 PROCEDURE — 1210000000 HC MED SURG R&B

## 2017-11-18 RX ORDER — IPRATROPIUM BROMIDE AND ALBUTEROL SULFATE 2.5; .5 MG/3ML; MG/3ML
1 SOLUTION RESPIRATORY (INHALATION)
Status: DISCONTINUED | OUTPATIENT
Start: 2017-11-18 | End: 2017-11-21 | Stop reason: HOSPADM

## 2017-11-18 RX ORDER — POTASSIUM CHLORIDE 7.45 MG/ML
10 INJECTION INTRAVENOUS
Status: DISCONTINUED | OUTPATIENT
Start: 2017-11-18 | End: 2017-11-18

## 2017-11-18 RX ORDER — SODIUM CHLORIDE 9 MG/ML
INJECTION, SOLUTION INTRAVENOUS CONTINUOUS
Status: DISCONTINUED | OUTPATIENT
Start: 2017-11-18 | End: 2017-11-19

## 2017-11-18 RX ORDER — POLYETHYLENE GLYCOL 3350 17 G/17G
17 POWDER, FOR SOLUTION ORAL 2 TIMES DAILY
Status: DISCONTINUED | OUTPATIENT
Start: 2017-11-18 | End: 2017-11-18

## 2017-11-18 RX ORDER — DOCUSATE SODIUM 100 MG/1
100 CAPSULE, LIQUID FILLED ORAL 2 TIMES DAILY
Status: DISCONTINUED | OUTPATIENT
Start: 2017-11-18 | End: 2017-11-18

## 2017-11-18 RX ORDER — POTASSIUM CHLORIDE 7.45 MG/ML
10 INJECTION INTRAVENOUS
Status: COMPLETED | OUTPATIENT
Start: 2017-11-18 | End: 2017-11-18

## 2017-11-18 RX ORDER — POTASSIUM CHLORIDE 20MEQ/15ML
40 LIQUID (ML) ORAL ONCE
Status: COMPLETED | OUTPATIENT
Start: 2017-11-18 | End: 2017-11-18

## 2017-11-18 RX ORDER — POTASSIUM CHLORIDE 7.45 MG/ML
10 INJECTION INTRAVENOUS
Status: DISPENSED | OUTPATIENT
Start: 2017-11-18 | End: 2017-11-18

## 2017-11-18 RX ORDER — HEPARIN SODIUM 5000 [USP'U]/ML
5000 INJECTION, SOLUTION INTRAVENOUS; SUBCUTANEOUS EVERY 8 HOURS SCHEDULED
Status: DISCONTINUED | OUTPATIENT
Start: 2017-11-18 | End: 2017-11-21 | Stop reason: HOSPADM

## 2017-11-18 RX ADMIN — Medication: at 20:36

## 2017-11-18 RX ADMIN — SODIUM CHLORIDE: 9 INJECTION, SOLUTION INTRAVENOUS at 15:28

## 2017-11-18 RX ADMIN — POTASSIUM CHLORIDE 10 MEQ: 10 INJECTION, SOLUTION INTRAVENOUS at 15:26

## 2017-11-18 RX ADMIN — IPRATROPIUM BROMIDE AND ALBUTEROL SULFATE 1 AMPULE: .5; 3 SOLUTION RESPIRATORY (INHALATION) at 18:37

## 2017-11-18 RX ADMIN — Medication: at 08:41

## 2017-11-18 RX ADMIN — SODIUM CHLORIDE: 9 INJECTION, SOLUTION INTRAVENOUS at 02:27

## 2017-11-18 RX ADMIN — BROMFENAC 1 DROP: 1.03 SOLUTION/ DROPS OPHTHALMIC at 08:41

## 2017-11-18 RX ADMIN — Medication 0.5 TABLET: at 08:40

## 2017-11-18 RX ADMIN — POTASSIUM CHLORIDE 10 MEQ: 10 INJECTION, SOLUTION INTRAVENOUS at 11:34

## 2017-11-18 RX ADMIN — POTASSIUM CHLORIDE 10 MEQ: 10 INJECTION, SOLUTION INTRAVENOUS at 13:33

## 2017-11-18 RX ADMIN — Medication 10 ML: at 08:39

## 2017-11-18 RX ADMIN — HEPARIN SODIUM 5000 UNITS: 5000 INJECTION INTRAVENOUS; SUBCUTANEOUS at 21:30

## 2017-11-18 RX ADMIN — POTASSIUM CHLORIDE 40 MEQ: 20 SOLUTION ORAL at 08:44

## 2017-11-18 RX ADMIN — FINASTERIDE 5 MG: 5 TABLET, FILM COATED ORAL at 08:40

## 2017-11-18 RX ADMIN — DOCUSATE SODIUM 100 MG: 100 CAPSULE, LIQUID FILLED ORAL at 08:39

## 2017-11-18 RX ADMIN — TAMSULOSIN HYDROCHLORIDE 0.4 MG: 0.4 CAPSULE ORAL at 08:39

## 2017-11-18 RX ADMIN — HEPARIN SODIUM 5000 UNITS: 5000 INJECTION INTRAVENOUS; SUBCUTANEOUS at 13:35

## 2017-11-18 RX ADMIN — VITAMIN D, TAB 1000IU (100/BT) 1000 UNITS: 25 TAB at 16:34

## 2017-11-18 RX ADMIN — SODIUM CHLORIDE: 9 INJECTION, SOLUTION INTRAVENOUS at 10:27

## 2017-11-18 RX ADMIN — DRONEDARONE 400 MG: 400 TABLET, FILM COATED ORAL at 08:40

## 2017-11-18 RX ADMIN — HEPARIN SODIUM 5000 UNITS: 5000 INJECTION INTRAVENOUS; SUBCUTANEOUS at 08:45

## 2017-11-18 RX ADMIN — AZITHROMYCIN MONOHYDRATE 500 MG: 500 INJECTION, POWDER, LYOPHILIZED, FOR SOLUTION INTRAVENOUS at 19:20

## 2017-11-18 RX ADMIN — CEFTRIAXONE 1 G: 1 INJECTION, SOLUTION INTRAVENOUS at 16:34

## 2017-11-18 RX ADMIN — DRONEDARONE 400 MG: 400 TABLET, FILM COATED ORAL at 16:34

## 2017-11-18 RX ADMIN — ASPIRIN 81 MG CHEWABLE TABLET 81 MG: 81 TABLET CHEWABLE at 08:39

## 2017-11-18 RX ADMIN — IPRATROPIUM BROMIDE AND ALBUTEROL SULFATE 1 AMPULE: .5; 3 SOLUTION RESPIRATORY (INHALATION) at 14:19

## 2017-11-18 RX ADMIN — Medication 140 MG: at 08:40

## 2017-11-18 RX ADMIN — POLYETHYLENE GLYCOL 3350 17 G: 17 POWDER, FOR SOLUTION ORAL at 08:40

## 2017-11-18 NOTE — PROGRESS NOTES
Patient's creatinine is 1.6 Bun is 29 today. Urine in rodriguez catheter is dark brown in color. Urine sent to lab for U/A and C&S. Bed in low position. Family at bedside. Call light within reach.

## 2017-11-18 NOTE — PROGRESS NOTES
WBC  7.3   HGB  8.5*   PLT  209     Recent Labs      11/17/17   1336  11/18/17   0347   NA  134*  136   K  3.8  3.2*   CL  95*  98   CO2  21*  26   BUN  45*  29*   CREATININE  3.7*  1.6*   GLUCOSE  131*  100     Recent Labs      11/17/17   1336   AST  43*   ALT  26   BILITOT  0.7   ALKPHOS  42     UA:  Recent Labs      11/17/17   1510   COLORU  YELLOW   PHUR  5.5   CLARITYU  Clear   SPECGRAV  1.010   LEUKOCYTESUR  Negative   UROBILINOGEN  0.2   BILIRUBINUR  Negative   BLOODU  Negative   GLUCOSEU  Negative       RAD:   Renal Ultrasound  Bilateral hydronephrosis. Left renal cyst.  Markedly distended urinary bladder. The etiology is not certain. Signed by Dr Lizzie Kearney on 11/17/2017 2:51 PM      Problem List:     Patient Active Problem List    Diagnosis Date Noted    Hypokalemia 11/18/2017    Dementia without behavioral disturbance 11/17/2017    Urinary retention due to benign prostatic hyperplasia 11/17/2017    Bilateral hydronephrosis 11/17/2017    Pneumonia of left lower lobe due to infectious organism ([address] 11/17/2017    Encephalopathy, metabolic 10/45/7626    Constipation 11/17/2017    AMADEO (acute kidney injury) ([address] 11/17/2017    Left elbow contusion 06/27/2017    Fracture of rib 06/27/2017    Benign non-nodular prostatic hyperplasia with lower urinary tract symptoms 06/27/2017    Sodium (Na) deficiency 06/27/2017    Fall 06/27/2017    Essential hypertension 06/27/2017    Skin cancer 05/08/2017     Status post radiotherapy at [facility] treatments. Dermatology treatment by Dr. Dante Stone Case.       Anemia 04/06/2015    Other specified abnormal findings of blood chemistry 04/06/2015     Updating Deprecated Diagnoses      Hematuria 11/07/2014    UTI (urinary tract infection) 11/06/2014    Chronic atrial fibrillation (HCC)     [place] (hard of hearing)        Assessment and Plan:     Principal Problem:    Acute kidney injury ([address] secondary to obstructive uropathy - Improved with rodriguez catheter placement and IVF's. Active Problems:    Chronic atrial fibrillation (HCC) - Continue Multaq. Resume digoxin. Benign non-nodular prostatic hyperplasia with lower urinary tract symptoms - Continue flomax and proscar. Rodriguez catheter in place. Essential hypertension - Controlled. Dementia without behavioral disturbance - Noted. Urinary retention due to benign prostatic hyperplasia - Rodriguez catheter placement. Bilateral hydronephrosis - Rodriguez catheter placement. Pneumonia of left lower lobe due to infectious organism [facility]) - Chest xray today. Rocephin, Azithromycin. Follow blood and sputum cultures. Encephalopathy, metabolic - Improving. Constipation - Increase Colace. Add Miralax. Hypokalemia - Replace and recheck. Renal function improving. Continue IVF's, rodriguez catheter. Obtain labs in am.    Antibiotic:  Rocephin, Azithromycin   DVT Prophylaxis:  Heparin    Josie Brambila PA-C  11/18/2017     I have independently interviewed and examined the patient. I have discussed key elements of the care plan with the PA or APRN & agree with the findings and care plan as documented above. Lizzette Heart MD    CC: \"Do you want to play? \" - offering to share the tube gauze that is occupying his attention  S: Denies pain or SOA, rodriguez does not bother him, no SOA or other complaint.   O:Vitals: /75   Pulse 87   Temp 99.4 °F (37.4 °C) (Temporal)   Resp 18   Wt 148 lb (67.1 kg)   SpO2 96%   BMI 21.24 kg/m²   24HR INTAKE/OUTPUT:      Intake/Output Summary (Last 24 hours) at 11/18/17 1315  Last data filed at 11/18/17 1235   Gross per 24 hour   Intake              210 ml   Output             3300 ml   Net            -3090 ml     General appearance: alert and cooperative with exam, offers to share his tube gauze  HEENT: atraumatic, eyes with clear conjunctiva and normal lids, pupils and irises normal, external ears and nose are normal, lips normal. Neck without masses, lympadenopathy, bruit, thyroid normal  Lungs: no increased work of breathing, clear to auscultation bilaterally without rales, rhonchi or wheezes  Heart: regular rate and rhythm, S1, S2 normal, no murmur, click, rub or gallop  Abdomen: soft, non-tender; bowel sounds normal; no masses,  no organomegaly  Extremities: extremities normal, atraumatic, no cyanosis 2 +  edema  Neurologic: No focal neurologic deficits, normal sensation, alert, mildly confused, affect and mood appropriate. Hard of hearing. Skin: no rashes, nodules, has mild eczema craque' but no erythema  A/P: AMADEO, will hydrate, minimize nephrotoxins, monitor CR, I/O - he is improving with rodriguez and med adjustments, obstructive uropathy leading to renal failure much better, Cr from 3/7 to 1.6 overnight.

## 2017-11-18 NOTE — PROGRESS NOTES
Speech Language Pathology  Facility/Department: [facility] KELLY/VAS/MED   BEDSIDE SWALLOW EVALUATION    NAME: Niki Garces  : 4/15/1922  MRN: [id number]    ADMISSION DATE: 2017  ADMITTING DIAGNOSIS: has Chronic atrial fibrillation ([address] [place] (hard of hearing); UTI (urinary tract infection); Hematuria; Anemia; Other specified abnormal findings of blood chemistry; Skin cancer; Left elbow contusion; Fracture of rib; Benign non-nodular prostatic hyperplasia with lower urinary tract symptoms; Sodium (Na) deficiency; Fall; Essential hypertension; Dementia without behavioral disturbance; Urinary retention due to benign prostatic hyperplasia; Bilateral hydronephrosis; Pneumonia of left lower lobe due to infectious organism [facility]); Encephalopathy, metabolic; Constipation; AMADEO (acute kidney injury) ([facility] and Hypokalemia on his problem list.  ONSET DATE: 17    Recent Chest Xray/CT of Chest: (17 Date )    Date of Eval: 2017  Evaluating Therapist: Jesus Manuel Pierce    Current Diet level:  Current Diet : Puree  Current Liquid Diet : Thin      Primary Complaint       Pain:  Pain Assessment  Patient Currently in Pain: No    Reason for Referral  Niki Garces was referred for a bedside swallow evaluation to assess the efficiency of his swallow function, rule out aspiration and make recommendations regarding safe dietary consistencies, effective compensatory strategies, and safe eating environment. Impression  Dysphagia Diagnosis: Mild to moderate oral stage dysphagia; Moderate pharyngeal stage dysphagia  Dysphagia Outcome Severity Scale: Level 3: Moderate dysphagia- Total assisstance, supervision or strategies. Two or more diet consistencies restricted     Treatment Plan  Requires SLP Intervention: Yes  Duration/Frequency of Treatment: 1 X daily times 5 days  D/C Recommendations:  To be determined       Recommended Diet and Intervention  Diet Solids Recommendation: Dysphagia I Pureed  Liquid Posterior Transit: Puree (pills)  Oral Phase  Oral Phase - Comment: He demonstrated significant difficulty swallowing pills. He chewed on them, moved them around in his mouth, and eventually spit them out. Indicators of Pharyngeal Phase Dysfunction   Pharyngeal Phase  Pharyngeal Phase: Exceptions  Indicators of Pharyngeal Phase Dysfunction  Wet Vocal Quality: Nectar - cup;Puree - teaspoon  Cough - Immediate: Thin - cup;Nectar - cup;Pudding - teaspoon  Pharyngeal Phase   Pharyngeal: pt demonstrated immediate coughing with thin liquids. liquids were thickened to NTL. Initially he did better with NTL. When he attempted to take pills with NTL. his coughing increased. He demo some coughing with pureed with crushed meds. Prognosis  Prognosis  Prognosis for safe diet advancement: guarded  Barriers to reach goals: cognitive deficits;age  Individuals consulted  Consulted and agree with results and recommendations: Patient; Family member;RN  Family member consulted: son    Education  Patient Education Response: No evidence of learning      G-Code  SLP G-Codes  Functional Limitations: Swallowing  Swallow Current Status (): At least 40 percent but less than 60 percent impaired, limited or restricted  Swallow Goal Status ():  At least 20 percent but less than 40 percent impaired, limited or restricted         Therapy Time  SLP Individual Minutes  Time In: 0830  Time Out: 0940  Minutes: [address] [place] [address]  11/18/2017 11:36 AM

## 2017-11-18 NOTE — PROGRESS NOTES
Ruth Muniz  mL/hr at 11/18/17 1027      bromfenac sodium (BROMDAY) 0.09 % ophthalmic solution 1 drop  1 drop Both Eyes Daily Glenard Nickels, PA-C   1 drop at 11/18/17 5011    acetaminophen (TYLENOL) tablet 500 mg  500 mg Oral Q6H PRN Glenard Nickels, PA-C        dronedarone hcl (MULTAQ) tablet 400 mg  400 mg Oral BID WC Glenard Nickels, PA-C   400 mg at 11/18/17 0840    nitroGLYCERIN (NITROSTAT) SL tablet 0.4 mg  0.4 mg Sublingual Q5 Min PRN Glenard Nickels, PA-C        pyrilamine-phenylephrine-dextromethorphan (CODIMAL DM) syrup 5 mL  5 mL Oral Q6H PRN Glenard Nickels, PA-C        cefTRIAXone (ROCEPHIN) 1 g in 50 mL IVPB (premix)  1 g Intravenous Q24H Glenard Nickels, PA-C   Stopped at 11/17/17 1823    azithromycin (ZITHROMAX) 500 mg in D5W 250ml Vial Mate  500 mg Intravenous Q24H Glenard Nickels, PA-C   Stopped at 11/17/17 1922    HYDROCERIN cream CREA   Topical BID Glenard Nickels, PA-C           Past Medical History:  Past Medical History:   Diagnosis Date    A-fib [facility])     Atrial fibrillation (HCC)     [facility]     Chronic kidney disease     COPD (chronic obstructive pulmonary disease) ([facility]     Emphysema of lung ([facility]     History of skin cancer     scalp, sees Dr. Vashti Ponce (hard of hearing)     Macular degeneration     OAB (overactive bladder)     Dr. Melgar Ser    Pneumonia of left lower lobe due to infectious organism ([facility] 11/17/2017       Past Surgical History:  Past Surgical History:   Procedure Laterality Date    APPENDECTOMY      CHOLECYSTECTOMY, LAPAROSCOPIC      EYE SURGERY Right 10/15/15    Dr. Edy Quinones.  HEMORRHOID SURGERY      TONSILLECTOMY         Family History  Family History   Problem Relation Age of Onset    Dementia Mother     Kidney Disease Father        Social History  Social History     Social History    Marital status:      Spouse name: N/A    Number of children: N/A    Years of education: N/A     Occupational History    Not on file. 2.0     Phosphorus:  Recent Labs      11/17/17   1336   PHOS  4.9*     HgbA1C: No results for input(s): LABA1C in the last 72 hours. Hepatic: Recent Labs      11/17/17   1336   ALKPHOS  42   ALT  26   AST  43*   PROT  5.7*   BILITOT  0.7   LABALBU  3.0*     Lactic Acid: No results for input(s): LACTA in the last 72 hours. Troponin: No results for input(s): CKTOTAL, CKMB, TROPONINT in the last 72 hours. ABGs: No results found for: PHART, PO2ART, POX9WLT  CRP:  No results for input(s): CRP in the last 72 hours. Sed Rate:  No results for input(s): SEDRATE in the last 72 hours. Culture Results:   Blood Culture Recent: No results for input(s): BC in the last 720 hours. Urine Culture Recent : No results for input(s): LABURIN in the last 720 hours. Radiology reports as per the Radiologist  Radiology: Xr Chest Standard (2 Vw)    Result Date: 11/18/2017  XR CHEST STANDARD TWO VW 11/18/2017 7:00 AM HISTORY: Follow-up pneumonia COMPARISON: Exam dated 1/29/2016. FINDINGS: A patchy lung infiltrate is identified in the left lung base and there is a small left-sided pleural effusion. The lungs are otherwise clear. Normal heart size with normal appearance of the pulmonary vasculature. No acute bony abnormalities are identified. 1. Patchy left lower lobe infiltrate thought to reflect pneumonia. Associated small layering parapneumonic effusion. Signed by Dr Inna Keller on 11/18/2017 10:32 AM    Us Renal Complete    Result Date: 11/17/2017  Examination. US RENAL COMPLETE History: The ultrasound examination of the kidneys bilaterally is performed. There is no previous study for comparison. The studies are limited diagnostic value due to the patient's body habitus. The right kidney measures 9.5 x 6.3 x 4.7 cm. No discrete mass is noted. There is moderate hydronephrosis. There is normal corticomedullary differentiation. The left kidney measures 11.6 x 6.1 x 5.5 cm.  There is a lobulated echolucent mass in the left kidney

## 2017-11-19 PROBLEM — E83.39 HYPOPHOSPHATEMIA: Status: ACTIVE | Noted: 2017-11-19

## 2017-11-19 LAB
ANION GAP SERPL CALCULATED.3IONS-SCNC: 9 MMOL/L (ref 7–19)
BUN BLDV-MCNC: 19 MG/DL (ref 8–23)
CALCIUM SERPL-MCNC: 8.3 MG/DL (ref 8.2–9.6)
CHLORIDE BLD-SCNC: 102 MMOL/L (ref 98–111)
CO2: 28 MMOL/L (ref 22–29)
CREAT SERPL-MCNC: 0.6 MG/DL (ref 0.5–1.2)
FERRITIN: 814.9 NG/ML (ref 30–400)
FOLATE: 18.7 NG/ML (ref 4.5–32.2)
GFR NON-AFRICAN AMERICAN: >60
GLUCOSE BLD-MCNC: 107 MG/DL (ref 74–109)
IRON SATURATION: 20 % (ref 14–50)
IRON: 33 UG/DL (ref 59–158)
MAGNESIUM: 2 MG/DL (ref 1.7–2.3)
PHOSPHORUS: 1.8 MG/DL (ref 2.5–4.5)
POTASSIUM SERPL-SCNC: 3.8 MMOL/L (ref 3.5–5)
SODIUM BLD-SCNC: 139 MMOL/L (ref 136–145)
TOTAL IRON BINDING CAPACITY: 167 UG/DL (ref 250–400)
VITAMIN B-12: 883 PG/ML (ref 211–946)

## 2017-11-19 PROCEDURE — 6370000000 HC RX 637 (ALT 250 FOR IP): Performed by: PHYSICIAN ASSISTANT

## 2017-11-19 PROCEDURE — 1210000000 HC MED SURG R&B

## 2017-11-19 PROCEDURE — 36415 COLL VENOUS BLD VENIPUNCTURE: CPT

## 2017-11-19 PROCEDURE — 83735 ASSAY OF MAGNESIUM: CPT

## 2017-11-19 PROCEDURE — 2580000003 HC RX 258: Performed by: PHYSICIAN ASSISTANT

## 2017-11-19 PROCEDURE — 82607 VITAMIN B-12: CPT

## 2017-11-19 PROCEDURE — 82746 ASSAY OF FOLIC ACID SERUM: CPT

## 2017-11-19 PROCEDURE — 99232 SBSQ HOSP IP/OBS MODERATE 35: CPT | Performed by: HOSPITALIST

## 2017-11-19 PROCEDURE — 82728 ASSAY OF FERRITIN: CPT

## 2017-11-19 PROCEDURE — 94640 AIRWAY INHALATION TREATMENT: CPT

## 2017-11-19 PROCEDURE — 6370000000 HC RX 637 (ALT 250 FOR IP): Performed by: INTERNAL MEDICINE

## 2017-11-19 PROCEDURE — 84100 ASSAY OF PHOSPHORUS: CPT

## 2017-11-19 PROCEDURE — 6360000002 HC RX W HCPCS: Performed by: PHYSICIAN ASSISTANT

## 2017-11-19 PROCEDURE — 83540 ASSAY OF IRON: CPT

## 2017-11-19 PROCEDURE — 83550 IRON BINDING TEST: CPT

## 2017-11-19 PROCEDURE — 80048 BASIC METABOLIC PNL TOTAL CA: CPT

## 2017-11-19 PROCEDURE — 2500000003 HC RX 250 WO HCPCS: Performed by: PHYSICIAN ASSISTANT

## 2017-11-19 RX ORDER — ASPIRIN 81 MG/1
81 TABLET, CHEWABLE ORAL DAILY
Status: DISCONTINUED | OUTPATIENT
Start: 2017-11-19 | End: 2017-11-21 | Stop reason: HOSPADM

## 2017-11-19 RX ORDER — FINASTERIDE 5 MG/1
5 TABLET, FILM COATED ORAL DAILY
Status: DISCONTINUED | OUTPATIENT
Start: 2017-11-19 | End: 2017-11-21 | Stop reason: HOSPADM

## 2017-11-19 RX ORDER — BISACODYL 10 MG
10 SUPPOSITORY, RECTAL RECTAL DAILY PRN
Status: DISCONTINUED | OUTPATIENT
Start: 2017-11-19 | End: 2017-11-21 | Stop reason: HOSPADM

## 2017-11-19 RX ORDER — TAMSULOSIN HYDROCHLORIDE 0.4 MG/1
0.4 CAPSULE ORAL 2 TIMES DAILY
Status: DISCONTINUED | OUTPATIENT
Start: 2017-11-19 | End: 2017-11-21 | Stop reason: HOSPADM

## 2017-11-19 RX ADMIN — IPRATROPIUM BROMIDE AND ALBUTEROL SULFATE 1 AMPULE: .5; 3 SOLUTION RESPIRATORY (INHALATION) at 18:44

## 2017-11-19 RX ADMIN — Medication 10 ML: at 10:32

## 2017-11-19 RX ADMIN — VITAMIN D, TAB 1000IU (100/BT) 1000 UNITS: 25 TAB at 07:56

## 2017-11-19 RX ADMIN — Medication: at 21:30

## 2017-11-19 RX ADMIN — BISACODYL 10 MG: 10 SUPPOSITORY RECTAL at 09:42

## 2017-11-19 RX ADMIN — HEPARIN SODIUM 5000 UNITS: 5000 INJECTION INTRAVENOUS; SUBCUTANEOUS at 06:04

## 2017-11-19 RX ADMIN — AZITHROMYCIN MONOHYDRATE 500 MG: 500 INJECTION, POWDER, LYOPHILIZED, FOR SOLUTION INTRAVENOUS at 18:19

## 2017-11-19 RX ADMIN — CEFTRIAXONE 1 G: 1 INJECTION, SOLUTION INTRAVENOUS at 17:32

## 2017-11-19 RX ADMIN — HEPARIN SODIUM 5000 UNITS: 5000 INJECTION INTRAVENOUS; SUBCUTANEOUS at 14:45

## 2017-11-19 RX ADMIN — SODIUM CHLORIDE: 9 INJECTION, SOLUTION INTRAVENOUS at 06:11

## 2017-11-19 RX ADMIN — IPRATROPIUM BROMIDE AND ALBUTEROL SULFATE 1 AMPULE: .5; 3 SOLUTION RESPIRATORY (INHALATION) at 14:04

## 2017-11-19 RX ADMIN — Medication: at 10:31

## 2017-11-19 RX ADMIN — BROMFENAC 1 DROP: 1.03 SOLUTION/ DROPS OPHTHALMIC at 10:35

## 2017-11-19 RX ADMIN — TAMSULOSIN HYDROCHLORIDE 0.4 MG: 0.4 CAPSULE ORAL at 11:18

## 2017-11-19 RX ADMIN — ASPIRIN 81 MG CHEWABLE TABLET 81 MG: 81 TABLET CHEWABLE at 11:18

## 2017-11-19 RX ADMIN — DRONEDARONE 400 MG: 400 TABLET, FILM COATED ORAL at 07:56

## 2017-11-19 RX ADMIN — MAGNESIUM HYDROXIDE 30 ML: 400 SUSPENSION ORAL at 11:18

## 2017-11-19 RX ADMIN — IPRATROPIUM BROMIDE AND ALBUTEROL SULFATE 1 AMPULE: .5; 3 SOLUTION RESPIRATORY (INHALATION) at 06:42

## 2017-11-19 RX ADMIN — HEPARIN SODIUM 5000 UNITS: 5000 INJECTION INTRAVENOUS; SUBCUTANEOUS at 21:30

## 2017-11-19 NOTE — PLAN OF CARE
Problem: Confusion - Acute:  Goal: Absence of continued neurological deterioration signs and symptoms    Absence of continued neurological deterioration signs and symptoms   Outcome: Ongoing

## 2017-11-19 NOTE — PROGRESS NOTES
creatinine down to 0.6 with a BUN of 19. Still with constipation. Review of Systems:   Limited due to hearing loss, hearing aid has been misplaced. Objective:   VITALS:  BP (!) 140/76   Pulse 82   Temp 98.5 °F (36.9 °C) (Temporal)   Resp 18   Wt 150 lb 9.6 oz (68.3 kg)   SpO2 94%   BMI 21.61 kg/m²   24HR INTAKE/OUTPUT:      Intake/Output Summary (Last 24 hours) at 11/19/17 1515  Last data filed at 11/19/17 1459   Gross per 24 hour   Intake             3876 ml   Output              600 ml   Net             3276 ml     General appearance: alert, appears stated age, cooperative and no distress  Head: Normocephalic, without obvious abnormality, atraumatic  Eyes: conjunctivae/corneas clear. PERRL, EOM's intact. Ears: normal external ears and nose, throat without exudate  Neck: Supple, no JVD, No tracheal deviation. Lungs: Normal respiratory effort, clear to auscultation bilaterally,no rales or wheezes   Heart: Irregularly irregular rhythm, S1, S2 normal, no murmur  Abdomen: soft, non-tender; non-distended, normal bowel sounds no masses, no organomegaly  Extremities:No lower extremity edema,  No erythema, no tenderness to palpation  Skin: Cancerous lesion on scalp, dry skin LE's. Lymphatic: No palpable lymph node enlargment  Neurologic: Very hard of hearing, confused, no focal deficits. Psychiatric: Alert, confused.       Medications:         potassium phosphate IVPB  15 mmol Intravenous Once    aspirin  81 mg Oral Daily    docusate  50 mg Oral BID    tamsulosin  0.4 mg Oral BID    finasteride  5 mg Oral Daily    heparin (porcine)  5,000 Units Subcutaneous 3 times per day    ipratropium-albuterol  1 ampule Inhalation Q4H WA    vitamin D3  1,000 Units Oral Daily    sodium chloride flush  10 mL Intravenous 2 times per day    bromfenac sodium  1 drop Both Eyes Daily    dronedarone hcl  400 mg Oral BID WC    cefTRIAXone (ROCEPHIN) IV  1 g Intravenous Q24H    azithromycin  500 mg Intravenous Q24H    HYDROCERIN   Topical BID     bisacodyl, sodium chloride flush, acetaminophen, magnesium hydroxide, ondansetron, acetaminophen, nitroGLYCERIN, pyrilamine-phenylephrine-dextromethorphan  DIET DYSPHAGIA I PUREED; Faison Thick     Lab and other Data:     Recent Labs      11/17/17   1336   WBC  7.3   HGB  8.5*   PLT  209     Recent Labs      11/17/17   1336  11/18/17   0347  11/19/17   0353   NA  134*  136  139   K  3.8  3.2*  3.8   CL  95*  98  102   CO2  21*  26  28   BUN  45*  29*  19   CREATININE  3.7*  1.6*  0.6   GLUCOSE  131*  100  107     Recent Labs      11/17/17   1336   AST  43*   ALT  26   BILITOT  0.7   ALKPHOS  42     UA:  Recent Labs      11/17/17   1510   COLORU  YELLOW   PHUR  5.5   CLARITYU  Clear   SPECGRAV  1.010   LEUKOCYTESUR  Negative   UROBILINOGEN  0.2   BILIRUBINUR  Negative   BLOODU  Negative   GLUCOSEU  Negative       RAD:   Renal Ultrasound  Bilateral hydronephrosis. Left renal cyst.  Markedly distended urinary bladder. The etiology is not certain. Signed by Dr Mahsa Altamirano on 11/17/2017 2:51 PM      Problem List:     Patient Active Problem List    Diagnosis Date Noted    Hypophosphatemia 11/19/2017    Hypokalemia 11/18/2017    Dementia without behavioral disturbance 11/17/2017    Urinary retention due to benign prostatic hyperplasia 11/17/2017    Bilateral hydronephrosis 11/17/2017    Pneumonia of left lower lobe due to infectious organism ([facility] 11/17/2017    Encephalopathy, metabolic 37/35/6734    Constipation 11/17/2017    AMADEO (acute kidney injury) ([facility] 11/17/2017    Left elbow contusion 06/27/2017    Fracture of rib 06/27/2017    Benign non-nodular prostatic hyperplasia with lower urinary tract symptoms 06/27/2017    Sodium (Na) deficiency 06/27/2017    Fall 06/27/2017    Essential hypertension 06/27/2017    Skin cancer 05/08/2017     Status post radiotherapy at [facility] treatments. Dermatology treatment by Dr. Lamont Klinefelter Case.       Anemia 04/06/2015

## 2017-11-19 NOTE — PROGRESS NOTES
Nephrology ([facility]) Progress Note    Patient:  Jeanice Duane  YOB: 1922  Date of Service: 11/19/2017  MRN: [id number]   Acct: [de-identified]   Primary Care Physician: Annel Encarnacion MD  Advance Directive: Full Code  Admit Date: 11/17/2017       Hospital Day: 2  Referring Provider: Zachery Whittington MD    Patient independently seen and examined, Chart, Consults, Notes, Operative notes, Labs, Cardiology, and Radiology studies reviewed as able. Subjective:  Jeanice Duane is a 80 y.o. male  whom we were consulted for AMADEO. D/w Tone Lr PA-C. Prior normal baseline creat in October. Admitted to [facility] where he was exposed to vancomycin/zosyn combination therapy and diuretics. Subsequently had worsening of renal function and was transferred to [facility] for further evaluation. Today, having continued difficulties with po meds. No overnight events per staff. Family present. More confused.     Allergies:  Latex and Bactrim [sulfamethoxazole-trimethoprim]    Medicines:  Current Facility-Administered Medications   Medication Dose Route Frequency Provider Last Rate Last Dose    potassium phosphate 15 mmol in dextrose 5 % 250 mL IVPB  15 mmol Intravenous Once SHIRA Russell-C   Stopped at 11/19/17 1017    bisacodyl (DULCOLAX) suppository 10 mg  10 mg Rectal Daily PRN Dorhardeep Diego, PA-C   10 mg at 11/19/17 4926    aspirin chewable tablet 81 mg  81 mg Oral Daily Dorcarlitoa Johnathon, PA-C   81 mg at 11/19/17 1118    docusate (COLACE) 50 MG/5ML liquid 50 mg  50 mg Oral BID Mando Diego PA-C        tamsulosin [facility]) capsule 0.4 mg  0.4 mg Oral BID Dorotha Johnathon, PA-C   0.4 mg at 11/19/17 1118    finasteride (PROSCAR) tablet 5 mg  5 mg Oral Daily SHIRA Russell-C        heparin (porcine) injection 5,000 Units  5,000 Units Subcutaneous 3 times per day Dorcarlitoa Johnathon, PA-C   5,000 Units at 11/19/17 0604    ipratropium-albuterol (DUONEB) nebulizer solution 1 ampule 1 ampule Inhalation Q4H WA Franct Spotted, PA-C   1 ampule at 11/18/17 1837    vitamin D (CHOLECALCIFEROL) tablet 1,000 Units  1,000 Units Oral Daily Gloris Kussmaul, MD   1,000 Units at 11/19/17 0756    0.9 % sodium chloride infusion   Intravenous Continuous Franchot Spotted, PA-C   Stopped at 11/19/17 1022    sodium chloride flush 0.9 % injection 10 mL  10 mL Intravenous 2 times per day Franchot Spotted, PA-C   10 mL at 11/19/17 1032    sodium chloride flush 0.9 % injection 10 mL  10 mL Intravenous PRN Franchot Spotted, PA-C        acetaminophen (TYLENOL) tablet 650 mg  650 mg Oral Q4H PRN Franct Spotted, PA-C        magnesium hydroxide (MILK OF MAGNESIA) 400 MG/5ML suspension 30 mL  30 mL Oral Daily PRN Franchot Spotted, PA-C   30 mL at 11/19/17 1118    ondansetron (ZOFRAN) injection 4 mg  4 mg Intravenous Q6H PRN Franct Spotted, PA-C        bromfenac sodium (BROMDAY) 0.09 % ophthalmic solution 1 drop  1 drop Both Eyes Daily Franchot Spotted, PA-C   1 drop at 11/19/17 1035    acetaminophen (TYLENOL) tablet 500 mg  500 mg Oral Q6H PRN Franct Spotted, PA-C        dronedarone hcl (MULTAQ) tablet 400 mg  400 mg Oral BID WC Franchot Spotted, PA-C   400 mg at 11/19/17 0756    nitroGLYCERIN (NITROSTAT) SL tablet 0.4 mg  0.4 mg Sublingual Q5 Min PRN Franct Spotted, PA-C        pyrilamine-phenylephrine-dextromethorphan (CODIMAL DM) syrup 5 mL  5 mL Oral Q6H PRN Franct Spotted, PA-C        cefTRIAXone (ROCEPHIN) 1 g in 50 mL IVPB (premix)  1 g Intravenous Q24H Franchot Spotted, PA-C   Stopped at 11/18/17 1704    azithromycin (ZITHROMAX) 500 mg in D5W 250ml Vial Mate  500 mg Intravenous Q24H Franchot Spotted, PA-C   Stopped at 11/18/17 2020    HYDROCERIN cream CREA   Topical BID Franchot Spotted, PA-C           Past Medical History:  Past Medical History:   Diagnosis Date    [facility])     Atrial fibrillation (HCC)     [facility] ([facility]     Chronic kidney disease     COPD [facility] obstructive [facility] disease) [facility]     Emphysema of nontender, normal bowel sounds  Extremities: no cyanosis, ble edema  Skin: warm/dry    Labs:  BMP:   Recent Labs      11/17/17   1336  11/18/17   0347  11/19/17   0353   NA  134*  136  139   K  3.8  3.2*  3.8   CL  95*  98  102   CO2  21*  26  28   PHOS  4.9*   --   1.8*   BUN  45*  29*  19   CREATININE  3.7*  1.6*  0.6   CALCIUM  8.7  8.2  8.3     CBC:   Recent Labs      11/17/17   1336   WBC  7.3   HGB  8.5*   HCT  25.2*   MCV  100.0*   PLT  209     LIVER PROFILE:   Recent Labs      11/17/17   1336   AST  43*   ALT  26   BILITOT  0.7   ALKPHOS  42     PT/INR: No results for input(s): PROTIME, INR in the last 72 hours. APTT: No results for input(s): APTT in the last 72 hours. BNP:  No results for input(s): BNP in the last 72 hours. Ionized Calcium:No results for input(s): IONCA in the last 72 hours. Magnesium:  Recent Labs      11/17/17   1336  11/19/17   0353   MG  2.0  2.0     Phosphorus:  Recent Labs      11/17/17   1336  11/19/17   0353   PHOS  4.9*  1.8*     HgbA1C: No results for input(s): LABA1C in the last 72 hours. Hepatic:   Recent Labs      11/17/17   1336   ALKPHOS  42   ALT  26   AST  43*   PROT  5.7*   BILITOT  0.7   LABALBU  3.0*     Lactic Acid: No results for input(s): LACTA in the last 72 hours. Troponin: No results for input(s): CKTOTAL, CKMB, TROPONINT in the last 72 hours. ABGs: No results found for: PHART, PO2ART, HTV2ZWA  CRP:  No results for input(s): CRP in the last 72 hours. Sed Rate:  No results for input(s): SEDRATE in the last 72 hours. Culture Results:   Blood Culture Recent:   Recent Labs      11/17/17   1833   BC  No Growth to date. Any change in status will be called. Urine Culture Recent : No results for input(s): LABURIN in the last 720 hours. Radiology reports as per the Radiologist  Radiology: Xr Chest Standard (2 Vw)    Result Date: 11/18/2017  XR CHEST STANDARD TWO VW 11/18/2017 7:00 AM HISTORY: Follow-up pneumonia COMPARISON: Exam dated 1/29/2016.

## 2017-11-20 LAB
ANION GAP SERPL CALCULATED.3IONS-SCNC: 10 MMOL/L (ref 7–19)
BUN BLDV-MCNC: 14 MG/DL (ref 8–23)
CALCIUM SERPL-MCNC: 8.2 MG/DL (ref 8.2–9.6)
CHLORIDE BLD-SCNC: 104 MMOL/L (ref 98–111)
CO2: 25 MMOL/L (ref 22–29)
CREAT SERPL-MCNC: 0.5 MG/DL (ref 0.5–1.2)
GFR NON-AFRICAN AMERICAN: >60
GLUCOSE BLD-MCNC: 109 MG/DL (ref 74–109)
HCT VFR BLD CALC: 25 % (ref 42–52)
HEMOGLOBIN: 7.9 G/DL (ref 14–18)
MCH RBC QN AUTO: 34.1 PG (ref 27–31)
MCHC RBC AUTO-ENTMCNC: 31.6 G/DL (ref 33–37)
MCV RBC AUTO: 107.8 FL (ref 80–94)
PDW BLD-RTO: 14.8 % (ref 11.5–14.5)
PHOSPHORUS: 2.5 MG/DL (ref 2.5–4.5)
PLATELET # BLD: 219 K/UL (ref 130–400)
PMV BLD AUTO: 9.1 FL (ref 9.4–12.4)
POTASSIUM SERPL-SCNC: 4.1 MMOL/L (ref 3.5–5)
RBC # BLD: 2.32 M/UL (ref 4.7–6.1)
SODIUM BLD-SCNC: 139 MMOL/L (ref 136–145)
WBC # BLD: 8.6 K/UL (ref 4.8–10.8)

## 2017-11-20 PROCEDURE — 99232 SBSQ HOSP IP/OBS MODERATE 35: CPT | Performed by: HOSPITALIST

## 2017-11-20 PROCEDURE — 51798 US URINE CAPACITY MEASURE: CPT

## 2017-11-20 PROCEDURE — 80048 BASIC METABOLIC PNL TOTAL CA: CPT

## 2017-11-20 PROCEDURE — 36415 COLL VENOUS BLD VENIPUNCTURE: CPT

## 2017-11-20 PROCEDURE — 2580000003 HC RX 258: Performed by: PHYSICIAN ASSISTANT

## 2017-11-20 PROCEDURE — 92526 ORAL FUNCTION THERAPY: CPT

## 2017-11-20 PROCEDURE — 85027 COMPLETE CBC AUTOMATED: CPT

## 2017-11-20 PROCEDURE — 6370000000 HC RX 637 (ALT 250 FOR IP): Performed by: PHYSICIAN ASSISTANT

## 2017-11-20 PROCEDURE — 1210000000 HC MED SURG R&B

## 2017-11-20 PROCEDURE — 84100 ASSAY OF PHOSPHORUS: CPT

## 2017-11-20 PROCEDURE — 6360000002 HC RX W HCPCS: Performed by: PHYSICIAN ASSISTANT

## 2017-11-20 PROCEDURE — 94640 AIRWAY INHALATION TREATMENT: CPT

## 2017-11-20 RX ADMIN — CEFTRIAXONE 1 G: 1 INJECTION, SOLUTION INTRAVENOUS at 15:50

## 2017-11-20 RX ADMIN — IPRATROPIUM BROMIDE AND ALBUTEROL SULFATE 1 AMPULE: .5; 3 SOLUTION RESPIRATORY (INHALATION) at 10:54

## 2017-11-20 RX ADMIN — AZITHROMYCIN MONOHYDRATE 500 MG: 500 INJECTION, POWDER, LYOPHILIZED, FOR SOLUTION INTRAVENOUS at 17:07

## 2017-11-20 RX ADMIN — Medication: at 21:28

## 2017-11-20 RX ADMIN — HEPARIN SODIUM 5000 UNITS: 5000 INJECTION INTRAVENOUS; SUBCUTANEOUS at 06:00

## 2017-11-20 RX ADMIN — HEPARIN SODIUM 5000 UNITS: 5000 INJECTION INTRAVENOUS; SUBCUTANEOUS at 15:36

## 2017-11-20 RX ADMIN — DRONEDARONE 400 MG: 400 TABLET, FILM COATED ORAL at 15:50

## 2017-11-20 RX ADMIN — BROMFENAC 1 DROP: 1.03 SOLUTION/ DROPS OPHTHALMIC at 09:22

## 2017-11-20 RX ADMIN — IPRATROPIUM BROMIDE AND ALBUTEROL SULFATE 1 AMPULE: .5; 3 SOLUTION RESPIRATORY (INHALATION) at 15:12

## 2017-11-20 RX ADMIN — IPRATROPIUM BROMIDE AND ALBUTEROL SULFATE 1 AMPULE: .5; 3 SOLUTION RESPIRATORY (INHALATION) at 07:28

## 2017-11-20 RX ADMIN — HEPARIN SODIUM 5000 UNITS: 5000 INJECTION INTRAVENOUS; SUBCUTANEOUS at 21:27

## 2017-11-20 RX ADMIN — Medication: at 09:22

## 2017-11-20 RX ADMIN — IPRATROPIUM BROMIDE AND ALBUTEROL SULFATE 1 AMPULE: .5; 3 SOLUTION RESPIRATORY (INHALATION) at 18:10

## 2017-11-20 NOTE — PROGRESS NOTES
Speech Language Pathology  Facility/Department: [facility] 3 KELLY/VAS/MED  SWALLOW THERAPY    NAME: Daryl Castaneda  : 4/15/1922  MRN: [id number]    Date of Treat: 2017  Evaluating Therapist: Rosalva Kam    Current Diet level:  Current Diet : Puree  Current Liquid Diet : Nectar    Reason for Referral  Daryl Castaneda was referred for a bedside swallow evaluation to assess the efficiency of his swallow function, rule out aspiration and make recommendations regarding safe dietary consistencies, effective compensatory strategies, and safe eating environment. Impression  Observed patient's swallowing function. Patient exhibited oral holding of puree consistency presentations (felt related to decreased cognition/mentation and decreased volitional control) as well as fast oral transit and suspected swallow delay of thinner liquid consistency (nectar thick). It is noted that patient exhibited consistent, immediate coughs following all PO presentations and trials. However, secondary to timing of swallows and presence of throat movement, do not feel that all coughs were related to initial penetration/aspiration; patient also exhibited same coughs frequently at rest.    At this time, would recommend continuation puree consistency but with honey thick liquids via spoon. Meds crushed in puree. TOTAL FEED. Treatment Plan  Requires SLP Intervention: Yes  D/C Recommendations: To be determined     Recommended Diet and Intervention  Diet Solids Recommendation: Dysphagia I Pureed  Liquid Consistency Recommendation: Honey (Via spoon)  Recommended Form of Meds: Crushed in puree as able  Therapeutic Interventions: Patient/Family education, Diet tolerance monitoring    Compensatory Swallowing Strategies  Compensatory Swallowing Strategies: Upright as possible for all oral intake; Total feed; Liquid by spoon only;Small bites/sips;Eat/Feed slowly; Alternate solids and liquids; External pacing;Remain upright for 30-45 minutes after meals    Treatment/Goals  Timeframe for Short-term Goals: 3 days  Goal 1: Patient will tolerate puree consistency and honey thick liquids with min S/S penetration/aspiration during PO intake. Goal 2: Patient/staff will follow swallow safety recommendations to decrease risk of penetration/aspiration during PO intake. Goal 3: Patient will receive daily oral care, via staff, to decrease bacteria from oral cavity. General  Chart Reviewed: Yes  Behavior/Cognition: Lethargic;Confused; Requires cueing  Respiratory Status: Room air  Follows Directions: Simple  Dentition: Poor dental/oral hygiene  Patient Positioning: Upright in bed  Volitional Cough: Weak;Congested  Consistencies Trialed: Puree;Nectar - teaspoon;Honey - teaspoon    Observed patient's swallowing function with the following observations noted:    Oral Phase Dysfunction  Oral Phase: Exceptions  Oral Phase Dysfunction  Suspected Premature Bolus Loss: Nectar - teaspoon (Patient exhibited fast oral transit of nectar thick liquid presentations administered via spoon by student; it is noted that patient was slow to form a seal around the spoon with his lips.)  Oral Phase - Comment: Oral transit of honey thick H2O trials, presented via spoon by student, primarily measured 1-2 seconds in length. Patient exhibited oral holding of puree consistency presentations administered by student. No volitional oral transit was noted until honey thick H2O trials were presented. Oral transit of puree consistency and honey thick H2O trial combinations primarily measured 1-2 seconds in length. Do feel decreased oral transit of puree consistency was related to decreased cognition/mentation and subsequent poor volitional control.       Indicators of Pharyngeal Phase Dysfunction  Pharyngeal Phase: Exceptions  Indicators of Pharyngeal Phase Dysfunction  Delayed Swallow: Nectar - teaspoon (Suspect secondary to fast oral transit times.)  Cough - Immediate: Nectar - teaspoon;Honey -

## 2017-11-20 NOTE — PLAN OF CARE
Ongoing    Goal: Able to refrain from responding to false sensory perceptions    Able to refrain from responding to false sensory perceptions   Outcome: Ongoing    Goal: Demonstrates accurate environmental perceptions    Demonstrates accurate environmental perceptions   Outcome: Ongoing    Goal: Able to distinguish between reality-based and nonreality-based thinking    Able to distinguish between reality-based and nonreality-based thinking   Outcome: Ongoing    Goal: Able to interrupt nonreality-based thinking    Able to interrupt nonreality-based thinking   Outcome: Ongoing      Problem: Sleep Pattern Disturbance:  Goal: Appears well-rested    Appears well-rested   Outcome: Ongoing

## 2017-11-20 NOTE — PROGRESS NOTES
[facility]      Patient:    Daryl Castaneda  YOB: 1922  Date of Service:  11/20/2017  MRN:    [id number]    Room:   [address] [place] [address]   PCP:    Tae Huff MD  Advance Directive:  Full Code  Admit Date:   11/17/2017       Hospital Day:  3    CHIEF COMPLAINT: ARF f/u    SUBJECTIVE:  Patient lying in bed. Patient extreme [place]. Patient denies any complaints at this time. Unsure how well he is comprehending at this time. CUMULATIVE HOSPITAL HISTORY: The patient is a 80 y. o. male who presented to [facility] via transfer from [facility] due to worsening renal failure.  The patient was admitted on 11/12/17 with AMS, cellulitis, and hyponatremia. Tatum Lassiter was initially monitored in ICU. Tatum Lassiter was placed on Vancomycin and Vaishali Mech was found to have pneumonia per CT of the chest.  Swallow study was obtained and the patient was placed on a pureed diet.  The patient seemed to be improving from an infection standpoint; however, his renal parameters declined.  Vancomycin and diuretics were discontinued.  Renal parameters continued to decline despite IVF's. Tatum Lassiter was transferred to our facility for nephrology consultation.     The patient does have a PMH significant for BPH.  The patient's son was present at the bedside and he reported that a rodriguez catheter was attempted at [facility] but placement was unsuccessful. Blood and sputum cultures were ordered. The patient was started on rocephin and azithromycin.  Urine studies obtained. Patient was noted to have an distended abdomen. Renal ultrasound is obtained, which revealed hydronephrosis bilaterally. Rodriguez catheter placed with an immediate return of 1350 mL of urine. Patient's renal parameters improved overnight with IVF's and FC placement. Due to patient's mentation and debility from hospitalization, SNF placement for rehab was recommended. Son agreed and SW is working on placement options.      REVIEW OF SYSTEMS:    14 point ROS sodium  1 drop Both Eyes Daily    dronedarone hcl  400 mg Oral BID WC    cefTRIAXone (ROCEPHIN) IV  1 g Intravenous Q24H    azithromycin  500 mg Intravenous Q24H    HYDROCERIN   Topical BID     bisacodyl, sodium chloride flush, acetaminophen, magnesium hydroxide, ondansetron, acetaminophen, nitroGLYCERIN, pyrilamine-phenylephrine-dextromethorphan    DIET:   DIET DYSPHAGIA I PUREED; Honey Thick    IV Access: PIV    Castro Catheter: 11/17/17    GI Prophylaxis: None    DVT Prophylaxis: heparin    DIAGNOSTIC DATA:  Recent Labs      11/17/17   1336  11/20/17   0250   WBC  7.3  8.6   RBC  2.52*  2.32*   HGB  8.5*  7.9*   HCT  25.2*  25.0*   MCV  100.0*  107.8*   MCH  33.7*  34.1*   MCHC  33.7  31.6*   PLT  209  219     Recent Labs      11/18/17   0347  11/19/17   0353  11/20/17   0250   NA  136  139  139   K  3.2*  3.8  4.1   ANIONGAP  12  9  10   CL  98  102  104   CO2  26  28  25   BUN  29*  19  14   CREATININE  1.6*  0.6  0.5   GLUCOSE  100  107  109   CALCIUM  8.2  8.3  8.2     Recent Labs      11/17/17   1336  11/19/17   0353  11/20/17   0250   MG  2.0  2.0   --    PHOS  4.9*  1.8*  2.5     Lab Results   Component Value Date    CALCIUM 8.2 11/20/2017    PHOS 2.5 11/20/2017     Recent Labs      11/17/17   1336   AST  43*   ALT  26   BILITOT  0.7   ALKPHOS  42     UA:  Recent Labs      11/17/17   1510   COLORU  YELLOW   PHUR  5.5   CLARITYU  Clear   SPECGRAV  1.010   LEUKOCYTESUR  Negative   UROBILINOGEN  0.2   BILIRUBINUR  Negative   BLOODU  Negative   GLUCOSEU  Negative     CXR 2 V 11/18/27  Impression  1. Patchy left lower lobe infiltrate thought to reflect pneumonia. Associated small layering parapneumonic effusion.               Signed by Dr Marion Hernandez on 11/18/2017 10:32 AM    ASSESSMENT/PLAN:  Principal Problem:  AMADEO (acute kidney injury) - 2' to obstruction - resolved  Active Problems:  Chronic atrial fibrillation - on multaq and telemetry  Benign non-nodular prostatic hyperplasia with lower urinary tract

## 2017-11-20 NOTE — PROGRESS NOTES
Mepilex dressing to left elbow and left hand changed. Skin tears noted to both areas. Patient tolerated well. Will continue to monitor.

## 2017-11-20 NOTE — PLAN OF CARE
Problem: Falls - Risk of  Goal: Absence of falls  Outcome: Ongoing      Problem: Risk for Impaired Skin Integrity  Goal: Tissue integrity - skin and mucous membranes  Structural intactness and normal physiological function of skin and  mucous membranes.    Outcome: Ongoing      Problem: Confusion - Acute:  Goal: Absence of continued neurological deterioration signs and symptoms    Absence of continued neurological deterioration signs and symptoms   Outcome: Ongoing    Goal: Mental status will be restored to baseline    Mental status will be restored to baseline   Outcome: Ongoing      Problem: Discharge Planning:  Goal: Ability to perform activities of daily living will improve    Ability to perform activities of daily living will improve   Outcome: Ongoing    Goal: Participates in care planning    Participates in care planning   Outcome: Ongoing      Problem: Injury - Risk of, Physical Injury:  Goal: Absence of physical injury    Absence of physical injury   Outcome: Ongoing    Goal: Will remain free from falls    Will remain free from falls   Outcome: Ongoing      Problem: Mood - Altered:  Goal: Mood stable    Mood stable   Outcome: Ongoing    Goal: Absence of abusive behavior    Absence of abusive behavior   Outcome: Ongoing    Goal: Verbalizations of feeling emotionally comfortable while being cared for will increase    Verbalizations of feeling emotionally comfortable while being cared for will increase   Outcome: Ongoing      Problem: Psychomotor Activity - Altered:  Goal: Absence of psychomotor disturbance signs and symptoms    Absence of psychomotor disturbance signs and symptoms   Outcome: Ongoing      Problem: Sensory Perception - Impaired:  Goal: Demonstrations of improved sensory functioning will increase    Demonstrations of improved sensory functioning will increase   Outcome: Ongoing    Goal: Decrease in sensory misperception frequency    Decrease in sensory misperception frequency   Outcome: Ongoing    Goal: Able to refrain from responding to false sensory perceptions    Able to refrain from responding to false sensory perceptions   Outcome: Ongoing    Goal: Demonstrates accurate environmental perceptions    Demonstrates accurate environmental perceptions   Outcome: Ongoing    Goal: Able to distinguish between reality-based and nonreality-based thinking    Able to distinguish between reality-based and nonreality-based thinking   Outcome: Ongoing    Goal: Able to interrupt nonreality-based thinking    Able to interrupt nonreality-based thinking   Outcome: Ongoing      Problem: Sleep Pattern Disturbance:  Goal: Appears well-rested    Appears well-rested   Outcome: Ongoing

## 2017-11-20 NOTE — PROGRESS NOTES
tablet 1,000 Units  1,000 Units Oral Daily Fer Reynolds MD   1,000 Units at 11/19/17 0756    sodium chloride flush 0.9 % injection 10 mL  10 mL Intravenous 2 times per day Enma Feliz PA-C   10 mL at 11/19/17 1032    sodium chloride flush 0.9 % injection 10 mL  10 mL Intravenous PRN Enma Feliz PA-C        acetaminophen (TYLENOL) tablet 650 mg  650 mg Oral Q4H PRN Enma Feliz PA-C        magnesium hydroxide (MILK OF MAGNESIA) 400 MG/5ML suspension 30 mL  30 mL Oral Daily PRN Enma Feliz PA-C   30 mL at 11/19/17 1118    ondansetron (ZOFRAN) injection 4 mg  4 mg Intravenous Q6H PRN Enma Feliz PA-C        bromfenac sodium (BROMDAY) 0.09 % ophthalmic solution 1 drop  1 drop Both Eyes Daily Enma Feliz PA-C   1 drop at 11/20/17 7040    acetaminophen (TYLENOL) tablet 500 mg  500 mg Oral Q6H PRN Enma Feliz PA-C        dronedarone hcl (MULTAQ) tablet 400 mg  400 mg Oral BID WC Enma Feliz PA-C   Stopped at 11/19/17 1700    nitroGLYCERIN (NITROSTAT) SL tablet 0.4 mg  0.4 mg Sublingual Q5 Min PRN Enma Feliz PA-C        pyrilamine-phenylephrine-dextromethorphan (CODIMAL DM) syrup 5 mL  5 mL Oral Q6H PRN Enma Feliz PA-C        cefTRIAXone (ROCEPHIN) 1 g in 50 mL IVPB (premix)  1 g Intravenous Q24H Enma Feliz PA-C   Stopped at 11/19/17 1822    azithromycin (ZITHROMAX) 500 mg in D5W 250ml Vial Mate  500 mg Intravenous Q24H Enma Feliz PA-C   Stopped at 11/19/17 1919    HYDROCERIN cream CREA   Topical BID Enma Feliz PA-C           Past Medical History:  Past Medical History:   Diagnosis Date    [facility]fib [facility])     Atrial fibrillation (HCC)     [facility]     Chronic kidney disease     COPD (chronic obstructive pulmonary disease) ([address]     Emphysema of lung ([address]     History of skin cancer     scalp, sees Dr. Joseph Salazar (hard of hearing)     Macular degeneration     OAB (overactive bladder)     Dr. Valerie Blanchard    Pneumonia of left lower lobe due to Signed by Dr Donata Simmonds on 11/18/2017 10:32 AM    Us Renal Complete    Result Date: 11/17/2017  Examination. US RENAL COMPLETE History: The ultrasound examination of the kidneys bilaterally is performed. There is no previous study for comparison. The studies are limited diagnostic value due to the patient's body habitus. The right kidney measures 9.5 x 6.3 x 4.7 cm. No discrete mass is noted. There is moderate hydronephrosis. There is normal corticomedullary differentiation. The left kidney measures 11.6 x 6.1 x 5.5 cm. There is a lobulated echolucent mass in the left kidney measuring 4.5 x 5.4 x 5.2 cm. There is good through transmission. There is moderate hydronephrosis of the left kidney. There is normal corticomedullary differentiation. The urinary bladder is moderately distended. No intrinsic abnormality. Bilateral hydronephrosis. Left renal cyst. Markedly distended urinary bladder. The etiology is not certain. Signed by Dr Ofelia Clemons on 11/17/2017 2:51 PM       Assessment   AMADEO / obstructive uropathy due to bladder urinary retention (>1L with rodriguez cath placement)  Anemia  Hyponatremia (chronic)  HTN  Afib     Plan:  Creatinine WNL.     Rodriguez cath due to bilateral hydronephrosis  Monitor labs      GUERO Trevino [facility]   11/20/17  12:17 PM

## 2017-11-20 NOTE — CARE COORDINATION
MARGOTH placed call to Pt's son, Jeremy Jimenes, regarding referral to [facility] SNF in [place]. He indicated that the ARNP had spoken with him about short-term rehab there after d/c and that he was agreeable.     MARGOTH faxed referral.    Electronically signed by RANDAL Ramirez on 11/20/2017 at 9:02 AM

## 2017-11-20 NOTE — PROGRESS NOTES
Nutrition Assessment    Type and Reason for Visit: Initial, Consult    Nutrition Recommendations: start ONS. If within clinical course may need EMMA (EN)    Malnutrition Assessment:  · Malnutrition Status: Meets the criteria for moderate malnutrition  · Context: Acute illness or injury  · Findings of the 6 clinical characteristics of malnutrition (Minimum of 2 out of 6 clinical characteristics is required to make the diagnosis of moderate or severe Protein Calorie Malnutrition based on AND/ASPEN Guidelines):  1. Energy Intake-Less than or equal to 50%, greater than 7 days    2. Weight Loss-5% loss or greater, in 1 month  3. Fat Loss-Moderate subcutaneous fat loss, Orbital, Triceps  4. Muscle Loss-Moderate muscle mass loss, Temples (temporalis muscle), Clavicles (pectoralis and deltoids)  5. Fluid Accumulation-Unable to assess,    6.  Strength-Not measured    Nutrition Diagnosis:   · Problem: Inadequate oral intake  · Etiology: related to Difficulty swallowing, Cognitive or neurological impairment     Signs and symptoms:  as evidenced by Intake 0-25%, Swallow study results    Nutrition Assessment:  · Subjective Assessment: Received consult for decreased po intake. Aware at this time, pt is spitting out most foods. did not eat any breakfast at time of my visit. Intake has been 0-25%. Pt known to me from [facility].  SLP recommended pureed diet with change to HTL. While at [address] was regular liquids with pureed. Son was observed giving pt a small powdered donut. Pt would take a bite then a sip of liquid then cough.   · Nutrition-Focused Physical Findings: thin appearing gentleman  · Wound Type: Skin Tears (redness, excoriation, abrasion, bruising)  · Current Nutrition Therapies:  · Oral Diet Orders: Dysphagia 1 (Pureed), Honey Thick   · Oral Diet intake: 0%, 1-25%  · Oral Nutrition Supplement (ONS) Orders: None - to start Magic cup at [facility]     · Anthropometric Measures:  · Ht: 5' 10\" (177.8

## 2017-11-21 ENCOUNTER — APPOINTMENT (OUTPATIENT)
Dept: GENERAL RADIOLOGY | Age: 82
DRG: 682 | End: 2017-11-21
Attending: HOSPITALIST
Payer: MEDICARE

## 2017-11-21 VITALS
TEMPERATURE: 98.8 F | OXYGEN SATURATION: 98 % | BODY MASS INDEX: 21.17 KG/M2 | SYSTOLIC BLOOD PRESSURE: 144 MMHG | HEIGHT: 70 IN | DIASTOLIC BLOOD PRESSURE: 82 MMHG | RESPIRATION RATE: 22 BRPM | WEIGHT: 147.9 LBS | HEART RATE: 92 BPM

## 2017-11-21 LAB
ANION GAP SERPL CALCULATED.3IONS-SCNC: 7 MMOL/L (ref 7–19)
BUN BLDV-MCNC: 11 MG/DL (ref 8–23)
CALCIUM SERPL-MCNC: 8.1 MG/DL (ref 8.2–9.6)
CHLORIDE BLD-SCNC: 105 MMOL/L (ref 98–111)
CO2: 31 MMOL/L (ref 22–29)
CREAT SERPL-MCNC: 0.5 MG/DL (ref 0.5–1.2)
GFR NON-AFRICAN AMERICAN: >60
GLUCOSE BLD-MCNC: 103 MG/DL (ref 74–109)
POTASSIUM SERPL-SCNC: 4.4 MMOL/L (ref 3.5–5)
SODIUM BLD-SCNC: 143 MMOL/L (ref 136–145)

## 2017-11-21 PROCEDURE — 36415 COLL VENOUS BLD VENIPUNCTURE: CPT

## 2017-11-21 PROCEDURE — G8983 BODY POS D/C STATUS: HCPCS

## 2017-11-21 PROCEDURE — G8981 BODY POS CURRENT STATUS: HCPCS

## 2017-11-21 PROCEDURE — 6370000000 HC RX 637 (ALT 250 FOR IP): Performed by: PHYSICIAN ASSISTANT

## 2017-11-21 PROCEDURE — 6370000000 HC RX 637 (ALT 250 FOR IP): Performed by: NURSE PRACTITIONER

## 2017-11-21 PROCEDURE — 80048 BASIC METABOLIC PNL TOTAL CA: CPT

## 2017-11-21 PROCEDURE — 6370000000 HC RX 637 (ALT 250 FOR IP): Performed by: INTERNAL MEDICINE

## 2017-11-21 PROCEDURE — 51702 INSERT TEMP BLADDER CATH: CPT

## 2017-11-21 PROCEDURE — G8982 BODY POS GOAL STATUS: HCPCS

## 2017-11-21 PROCEDURE — 94640 AIRWAY INHALATION TREATMENT: CPT

## 2017-11-21 PROCEDURE — 2580000003 HC RX 258: Performed by: PHYSICIAN ASSISTANT

## 2017-11-21 PROCEDURE — 97162 PT EVAL MOD COMPLEX 30 MIN: CPT

## 2017-11-21 PROCEDURE — 6360000002 HC RX W HCPCS: Performed by: PHYSICIAN ASSISTANT

## 2017-11-21 PROCEDURE — 99239 HOSP IP/OBS DSCHRG MGMT >30: CPT | Performed by: HOSPITALIST

## 2017-11-21 PROCEDURE — 99221 1ST HOSP IP/OBS SF/LOW 40: CPT | Performed by: PHYSICIAN ASSISTANT

## 2017-11-21 PROCEDURE — 71020 XR CHEST STANDARD TWO VW: CPT

## 2017-11-21 RX ORDER — TAMSULOSIN HYDROCHLORIDE 0.4 MG/1
0.4 CAPSULE ORAL 2 TIMES DAILY
Qty: 30 CAPSULE | Refills: 3 | DISCHARGE
Start: 2017-11-21

## 2017-11-21 RX ORDER — LANOLIN ALCOHOL/MO/W.PET/CERES
CREAM (GRAM) TOPICAL 2 TIMES DAILY
Refills: 0 | DISCHARGE
Start: 2017-11-21

## 2017-11-21 RX ORDER — AMOXICILLIN AND CLAVULANATE POTASSIUM 875; 125 MG/1; MG/1
1 TABLET, FILM COATED ORAL 2 TIMES DAILY
Refills: 0 | DISCHARGE
Start: 2017-11-21 | End: 2017-11-26

## 2017-11-21 RX ORDER — FINASTERIDE 5 MG/1
5 TABLET, FILM COATED ORAL DAILY
Qty: 30 TABLET | Refills: 3 | DISCHARGE
Start: 2017-11-22

## 2017-11-21 RX ORDER — BUMETANIDE 1 MG/1
1 TABLET ORAL DAILY
Status: DISCONTINUED | OUTPATIENT
Start: 2017-11-21 | End: 2017-11-21 | Stop reason: HOSPADM

## 2017-11-21 RX ORDER — DIGOXIN 125 MCG
187.5 TABLET ORAL DAILY
Status: DISCONTINUED | OUTPATIENT
Start: 2017-11-21 | End: 2017-11-21 | Stop reason: HOSPADM

## 2017-11-21 RX ORDER — BUMETANIDE 0.25 MG/ML
1 INJECTION, SOLUTION INTRAMUSCULAR; INTRAVENOUS ONCE
Status: DISCONTINUED | OUTPATIENT
Start: 2017-11-21 | End: 2017-11-21

## 2017-11-21 RX ADMIN — HEPARIN SODIUM 5000 UNITS: 5000 INJECTION INTRAVENOUS; SUBCUTANEOUS at 05:45

## 2017-11-21 RX ADMIN — IPRATROPIUM BROMIDE AND ALBUTEROL SULFATE 1 AMPULE: .5; 3 SOLUTION RESPIRATORY (INHALATION) at 15:17

## 2017-11-21 RX ADMIN — IPRATROPIUM BROMIDE AND ALBUTEROL SULFATE 1 AMPULE: .5; 3 SOLUTION RESPIRATORY (INHALATION) at 11:27

## 2017-11-21 RX ADMIN — VITAMIN D, TAB 1000IU (100/BT) 1000 UNITS: 25 TAB at 08:45

## 2017-11-21 RX ADMIN — IPRATROPIUM BROMIDE AND ALBUTEROL SULFATE 1 AMPULE: .5; 3 SOLUTION RESPIRATORY (INHALATION) at 07:31

## 2017-11-21 RX ADMIN — Medication 10 ML: at 09:11

## 2017-11-21 RX ADMIN — ASPIRIN 81 MG CHEWABLE TABLET 81 MG: 81 TABLET CHEWABLE at 08:44

## 2017-11-21 RX ADMIN — DRONEDARONE 400 MG: 400 TABLET, FILM COATED ORAL at 08:44

## 2017-11-21 RX ADMIN — Medication: at 08:44

## 2017-11-21 RX ADMIN — BROMFENAC 1 DROP: 1.03 SOLUTION/ DROPS OPHTHALMIC at 08:44

## 2017-11-21 RX ADMIN — BUMETANIDE 1 MG: 1 TABLET ORAL at 15:44

## 2017-11-21 RX ADMIN — FINASTERIDE 5 MG: 5 TABLET, FILM COATED ORAL at 08:44

## 2017-11-21 RX ADMIN — TAMSULOSIN HYDROCHLORIDE 0.4 MG: 0.4 CAPSULE ORAL at 08:45

## 2017-11-21 RX ADMIN — DIGOXIN 187.5 MCG: 125 TABLET ORAL at 15:44

## 2017-11-21 NOTE — PROGRESS NOTES
vitamin D (CHOLECALCIFEROL) tablet 1,000 Units  1,000 Units Oral Daily Isha Garnett MD   1,000 Units at 11/19/17 0756    sodium chloride flush 0.9 % injection 10 mL  10 mL Intravenous 2 times per day SHIRA Russell-JOSUE   10 mL at 11/19/17 1032    sodium chloride flush 0.9 % injection 10 mL  10 mL Intravenous PRN Mando Diego, PA-C        acetaminophen (TYLENOL) tablet 650 mg  650 mg Oral Q4H PRN Mando Diego, PA-C        magnesium hydroxide (MILK OF MAGNESIA) 400 MG/5ML suspension 30 mL  30 mL Oral Daily PRN SHIRA Russell-C   30 mL at 11/19/17 1118    ondansetron (ZOFRAN) injection 4 mg  4 mg Intravenous Q6H PRN Mando Diego, PA-C        bromfenac sodium (BROMDAY) 0.09 % ophthalmic solution 1 drop  1 drop Both Eyes Daily SHIRA Russell-C   1 drop at 11/20/17 4601    acetaminophen (TYLENOL) tablet 500 mg  500 mg Oral Q6H PRN LORENE RussellC        dronedarone hcl (MULTAQ) tablet 400 mg  400 mg Oral BID WC LORENE RussellC   400 mg at 11/20/17 1550    nitroGLYCERIN (NITROSTAT) SL tablet 0.4 mg  0.4 mg Sublingual Q5 Min PRN Mando Diego, PA-C        pyrilamine-phenylephrine-dextromethorphan (CODIMAL DM) syrup 5 mL  5 mL Oral Q6H PRN LORENE RussellC        cefTRIAXone (ROCEPHIN) 1 g in 50 mL IVPB (premix)  1 g Intravenous Q24H Mando Diego, PA-C   Stopped at 11/20/17 1620    azithromycin (ZITHROMAX) 500 mg in D5W 250ml Vial Mate  500 mg Intravenous Q24H Juan Daniela Johnathon, PA-C   Stopped at 11/20/17 1807    HYDROCERIN cream CREA   Topical BID Mando Diego PA-C           Past Medical History:  Past Medical History:   Diagnosis Date    [facility]-fib [facility])     Atrial fibrillation (HCC)     [facility]     Chronic kidney disease     [facility]     Emphysema of lung ([address]     History of skin cancer     scalp, sees Dr. Von Shelby (hard of hearing)     Macular degeneration     OAB (overactive bladder)     Dr. Adrian Mccarthy    Pneumonia of left lower lobe due to infectious organism [facility]) 11/17/2017       Past Surgical History:  Past Surgical History:   Procedure Laterality Date    APPENDECTOMY      CHOLECYSTECTOMY, LAPAROSCOPIC      EYE SURGERY Right 10/15/15    Dr. Eric Chambers.  HEMORRHOID SURGERY      TONSILLECTOMY         Family History  Family History   Problem Relation Age of Onset    Dementia Mother     Kidney Disease Father        Social History  Social History     Social History    Marital status:      Spouse name: N/A    Number of children: N/A    Years of education: N/A     Occupational History    Not on file. Social History Main Topics    Smoking status: Former Smoker    Smokeless tobacco: Never Used    Alcohol use No    Drug use: No    Sexual activity: Not on file     Other Topics Concern    Not on file     Social History Narrative    No narrative on file         Review of Systems:  Unable to obtain due to mental status     History obtained from   Objective:  Blood pressure (!) 158/83, pulse 82, temperature 98.1 °F (36.7 °C), temperature source Temporal, resp. rate 20, height 5' 10\" (1.778 m), weight 147 lb 14.4 oz (67.1 kg), SpO2 95 %.     Intake/Output Summary (Last 24 hours) at 11/21/17 0832  Last data filed at 11/21/17 0546   Gross per 24 hour   Intake              828 ml   Output              775 ml   Net               53 ml     General: awake/alert, improving dry mm  Chest:  clear to auscultation bilaterally without respiratory distress  CVS: irreg  Abdominal: soft, nontender, normal bowel sounds  Extremities: no cyanosis, ble edema  Skin: warm/dry    Labs:  BMP:   Recent Labs      11/19/17   0353  11/20/17   0250  11/21/17   0313   NA  139  139  143   K  3.8  4.1  4.4   CL  102  104  105   CO2  28  25  31*   PHOS  1.8*  2.5   --    BUN  19  14  11   CREATININE  0.6  0.5  0.5   CALCIUM  8.3  8.2  8.1*     CBC:   Recent Labs      11/20/17   0250   WBC  8.6   HGB  7.9*   HCT  25.0*   MCV  107.8*   PLT  219

## 2017-11-21 NOTE — PROGRESS NOTES
Informed GUERO Keita about the patients heart rate being 149 on tele but decreasing back to its normal range. The patient was moving his legs off the bed when I went to check on him. He was awake and repositioned. Will continue to monitor patient.  Electronically signed by Lola Groves RN on 11/21/2017 at 12:46 PM

## 2017-11-21 NOTE — PROGRESS NOTES
Palliative Care: Attempted to initiate palliative care. Pt is resting in bed, has his ball cap on. He is very [place]. Tried to ask him some questions but he appeared to not understand what I was asking him. Past Medical History:   A-fib, Cancer, CKD, COPD, Macular degeneration, Emphysema of lung       Advance Directives:  Full code, Living will on file. Pain/Other Symptoms:  Pt appears comfortable, no moaning or grimacing noted. No SOA noted. Activity:  As roz with assist           Psychological/Spiritual:   Unable to assess          Patient/Family Discussion:    Unable to reach son by phone. Plan/expectations:       Long term goals:  Pt to be dc to SNF today. I spoke with his nurse, she tells me pt alert this morning and took all of his meds for her. I tried again to go in and talk with pt. He was resting eyes closed. When I called his name and asked how he was, pt again seemed to be unable to answer my question. PC nurse told pt that I would return later and pt responded \"Ok\". Pt is due to go to SNF today. Tried to call pt son, no answer. PC will follow as needed for support and POC.     Electronically signed by Meaghan Pritchard RN on 11/21/2017 at 11:21 AM

## 2017-11-21 NOTE — PLAN OF CARE
Problem: Falls - Risk of  Goal: Absence of falls  Outcome: Ongoing      Problem: Risk for Impaired Skin Integrity  Goal: Tissue integrity - skin and mucous membranes  Structural intactness and normal physiological function of skin and  mucous membranes.    Outcome: Ongoing      Problem: Confusion - Acute:  Goal: Absence of continued neurological deterioration signs and symptoms    Absence of continued neurological deterioration signs and symptoms   Outcome: Ongoing    Goal: Mental status will be restored to baseline    Mental status will be restored to baseline   Outcome: Ongoing      Problem: Discharge Planning:  Goal: Ability to perform activities of daily living will improve    Ability to perform activities of daily living will improve   Outcome: Ongoing    Goal: Participates in care planning    Participates in care planning   Outcome: Ongoing      Problem: Injury - Risk of, Physical Injury:  Goal: Absence of physical injury    Absence of physical injury   Outcome: Ongoing    Goal: Will remain free from falls    Will remain free from falls   Outcome: Ongoing      Problem: Mood - Altered:  Goal: Mood stable    Mood stable   Outcome: Ongoing    Goal: Absence of abusive behavior    Absence of abusive behavior   Outcome: Ongoing    Goal: Verbalizations of feeling emotionally comfortable while being cared for will increase    Verbalizations of feeling emotionally comfortable while being cared for will increase   Outcome: Ongoing      Problem: Psychomotor Activity - Altered:  Goal: Absence of psychomotor disturbance signs and symptoms    Absence of psychomotor disturbance signs and symptoms   Outcome: Ongoing      Problem: Sensory Perception - Impaired:  Goal: Demonstrations of improved sensory functioning will increase    Demonstrations of improved sensory functioning will increase   Outcome: Ongoing    Goal: Decrease in sensory misperception frequency    Decrease in sensory misperception frequency   Outcome: Ongoing    Goal: Able to refrain from responding to false sensory perceptions    Able to refrain from responding to false sensory perceptions   Outcome: Ongoing    Goal: Demonstrates accurate environmental perceptions    Demonstrates accurate environmental perceptions   Outcome: Ongoing    Goal: Able to distinguish between reality-based and nonreality-based thinking    Able to distinguish between reality-based and nonreality-based thinking   Outcome: Ongoing    Goal: Able to interrupt nonreality-based thinking    Able to interrupt nonreality-based thinking   Outcome: Ongoing      Problem: Sleep Pattern Disturbance:  Goal: Appears well-rested    Appears well-rested   Outcome: Ongoing      Problem: Nutrition  Goal: Optimal nutrition therapy  Nutrition Problem: Inadequate oral intake  Intervention: Food and/or Nutrient Delivery: Continue current diet, Start ONS  Nutritional Goals: po intake 50% or greater     Outcome: Ongoing

## 2017-11-21 NOTE — CONSULTS
Consults   Department of Urology  Physician Assistant Consult Note  Vanna Milner PA-C. Reason for Consult:  Has indwelling catheter for retention requested  recommendations  Requesting Physician:  Dr. Holbrook Cure:  Inability to void and obstructive uropathy which improved after rodriguez placement. History Obtained From:  patient, electronic medical record    HISTORY OF PRESENT ILLNESS:                The patient is a 80 y.o. male who presented to [facility] via transfer from [facility] due to worsening renal failure. Patient was admitted with AMS,cellulitis, and hyponatremia. He continued to have decline of renal function so was transferred here. [facility] attempted rodriguez catheter placement without success but one was placed here and 1350 ml of urine was drained. He does have previous history of BPH and had prior prostate surgery in the past per family member. He see Dr. Birgit Shaw at the [facility] urology group. There was evidence of bilateral obstructive uropathy on renal US. His creatinine has steadily decreased since rodriguez catheter placement and is now 0.5. Catheter is draining dark yellow urine. Last seen by Dr. Birgit Shaw 11/30/2016. Tolerating catheter now and is on maximum medical therapy on tamsulosin and finasteride  Past Medical History:        Diagnosis Date    A-fib [facility])     Atrial fibrillation (HCC)     Cancer (HCC)     Chronic kidney disease     COPD (chronic obstructive pulmonary disease) (HCC)     Emphysema of lung ([address]     History of skin cancer     scalp, sees Dr. Sal Hernandez (hard of hearing)     Macular degeneration     OAB (overactive bladder)     Dr. Birgit Shaw    Pneumonia of left lower lobe due to infectious organism ([facility] 11/17/2017     Past Surgical History:        Procedure Laterality Date    APPENDECTOMY      CHOLECYSTECTOMY, LAPAROSCOPIC      EYE SURGERY Right 10/15/15    Dr. Jhonny Santiago.     HEMORRHOID SURGERY     

## 2017-11-21 NOTE — PROGRESS NOTES
Physical Therapy    Facility/Department: [facility] 3 KELLY/VAS/MED  Initial Assessment    NAME: Jeanice Duane  : 4/15/1922  MRN: [id number]    Date of Service: 2017    Patient Diagnosis(es): There were no encounter diagnoses. has a past medical history of A-fib ([facility] Atrial fibrillation ([facility] Cancer ([facility] Chronic kidney disease; COPD (chronic obstructive pulmonary disease) ([facility] Emphysema of lung ([facility] History of skin cancer; [place] (hard of hearing); Macular degeneration; OAB (overactive bladder); and Pneumonia of left lower lobe due to infectious organism [facility]). has a past surgical history that includes Appendectomy; Cholecystectomy, laparoscopic; Eye surgery (Right, 10/15/15); Tonsillectomy; and Hemorrhoid surgery. Restrictions  Restrictions/Precautions  Required Braces or Orthoses?: No  Vision/Hearing  Vision: Impaired  Vision Exceptions: Wears glasses at all times  Hearing: Exceptions to [facility]  Hearing Exceptions: Hard of hearing/hearing concerns;Right hearing aid     Subjective  General  Chart Reviewed: Yes  Patient assessed for rehabilitation services?: Yes  Response To Previous Treatment: Not applicable  Family / Caregiver Present: No  Referring Practitioner: oTne Lr PA-C  Referral Date : 17  Diagnosis: AMADEO  Follows Commands: Impaired  Other (Comment): pt. very [place]  General Comment  Comments: Ashleigh DAVIS PT, and she states pt. is leaving to go to SNF today. Pt. pulling on catheter tubing upon PT entry to room. Subjective  Subjective: Pt. states if he walks too much his legs hurt. Pain Screening  Patient Currently in Pain: Unable to Assess (pt. so Craig)  Vital Signs  Patient Currently in Pain: Unable to Assess (pt. so [place])  Pre Treatment Pain Screening  Intervention List: Patient able to continue with treatment    Orientation  Orientation  Overall Orientation Status: Impaired  Orientation Level: Disoriented to situation;Disoriented to time;Disoriented to place; Disoriented to Current Status (): At least 80 percent but less than 100 percent impaired, limited or restricted  Changing and Maintaining Body Position Goal Status (): At least 80 percent but less than 100 percent impaired, limited or restricted  Changing and Maintaining Body Position Discharge Status ():  At least 80 percent but less than 100 percent impaired, limited or restricted  OutComes Score                                           Goals  Patient Goals   Patient goals : none stated       Therapy Time   Individual Concurrent Group Co-treatment   Time In           Time Out           Minutes                   Vick Tobar PT    Electronically signed by Vick Tobar PT on 11/21/2017 at 2:51 PM

## 2017-11-21 NOTE — CARE COORDINATION
F/U call with Nellie at [facility]; they are agreeable to take Pt today. Pt set to d/c today. Meagan notified.       [address] [facility]  [phone] P  [phone] F  Electronically signed by RANDAL Abraham on 11/21/2017 at 9:23 AM

## 2017-11-21 NOTE — DISCHARGE SUMMARY
[facility] Discharge Summary    Sylwia Strickland  :  4/15/1922  MRN:  [id number]    Admit date:  2017  Discharge date:  17    Admission Diagnoses: AMADEO (acute kidney injury) [facility]) [N17.9]    Discharge Diagnoses:   Principal Problem:  AMADEO (acute kidney injury) - 2' to obstruction - resolved  Active Problems:  Chronic atrial fibrillation - on multaq & restarted lanoxin  Benign non-nodular prostatic hyperplasia with lower urinary tract symptoms - has FC will need to [facility] with Dr. Birgit Shaw after discharge. Cont Flomax & Proscar  Essential hypertension - controlled   Dementia without behavioral disturbance - noted  Urinary retention due to benign prostatic hyperplasia - see above  Bilateral hydronephrosis - see above  Pneumonia of left lower lobe due to infectious organism - Rocephin & azithromycin, recheck CXR 17 Bumex 1 mg PO prior DC Augmentin 875/125 BID for 5 days  Encephalopathy, metabolic - improving  Constipation BM 17 continue dulcolax and MOM  Hypokalemia - monitor labs & replace as indicated  Hypophosphatemia - monitor labs and replace as indicated. Admitting Physician: Madina Shah MD     Discharge Physician: Dr. Cindy Oliver: nephrology    Treatments: IV hydration, antibiotics: ceftriaxone and azithromycin, respiratory therapy: O2, HHFM and albuterol/atropine nebulizer and therapies: PT, OT and ST    Brief History: Patient was transferred from OSH due to ARF. Hospital Course: The patient is a 80 y. o. male who presented to [facility] via transfer from [facility] due to worsening renal failure.  The patient was admitted to [facility] on 17 with AMS, cellulitis, and hyponatremia. Sandra Santamaria was initially monitored in ICU. Sandra Beekeri was placed on Vancomycin and Angelica Brooks was found to have pneumonia per CT of the chest.  Swallow study was obtained and the patient was placed on a pureed diet.  The patient seemed to be improving from an infection standpoint; however, his renal parameters declined.  Vancomycin and diuretics were discontinued.  Renal parameters continued to decline despite IVF's. He was transferred to our facility for nephrology consultation.      The patient does have a PMH significant for BPH.  The patient's son was present at the bedside and he reported that a rodriguez catheter was attempted at [facility] but placement was unsuccessful. Blood and sputum cultures were ordered. The patient was started on rocephin and azithromycin. Patient was noted to have an distended abdomen. Renal ultrasound is obtained, which revealed hydronephrosis bilaterally. Rodriguez catheter placed with an immediate return of 1350 mL of urine. Patient's renal parameters improved overnight with IVF's and FC placement. Due to patient's mentation and debility from hospitalization, SNF placement for rehab was recommended. Son agreed and SW is working on placement options. Patient was seen by SLP and his current diet is pureed with HTL. Follow up chest xray indicated early CHF. Patient will be given one time dose of Bumex before discharge. Since aspiration was questionable from OSH, patient will discharged on Augmentin for additional 5 days. Discharge Exam:  PHYSICAL EXAM:  Constitutional:  Well-developed. Appears stated age. No distress. Extremely [place]. HENT:    Head: Normocephalic and atraumatic.    Mouth/Throat: Oropharynx is clear and moist. No oropharyngeal exudate. Eyes: Conjunctivae and EOM are normal. Pupils are equal, round, and reactive to light. No scleral icterus. Neck: Normal range of motion. Neck supple. No JVD present. No tracheal deviation present. No thyromegaly present. Cardiovascular: Irregular rate & rhythm and normal heart sounds.  Exam reveals no gallop and no friction rub. No murmur heard. Pulmonary/Chest: Effort normal and breath sounds normal. Rales LLL No stridor. No respiratory distress.  No wheezes. Abdominal: Soft. Bowel sounds are normal. No distension and no mass. There is no tenderness. There is no rebound and no guarding. Musculoskeletal: Normal range of motion. There is no edema or tenderness. Neurological: Alert and oriented to person [place] confused  Skin: Cancerous lesion to scalp. Dry skin with scattered bruising/purple spots. Discharged Condition: stable    Disposition: SNF    Discharge Medications:     Medication List      START taking these medications    finasteride 5 MG tablet  Commonly known as:  PROSCAR  Take 1 tablet by mouth daily  Start taking on:  11/22/2017     HYDROCERIN Crea cream  Apply topically 2 times daily     vitamin D 1000 UNIT Tabs tablet  Commonly known as:  CHOLECALCIFEROL  Take 1 tablet by mouth daily  Start taking on:  11/22/2017        CHANGE how you take these medications    PROLENSA 0.07 % Soln  Generic drug:  bromfenac  What changed:  Another medication with the same name was removed. Continue taking this medication, and follow the directions you see here.      tamsulosin 0.4 MG capsule  Commonly known as:  FLOMAX  Take 1 capsule by mouth 2 times daily  What changed:  · when to take this  · additional instructions        CONTINUE taking these medications    acetaminophen 500 MG tablet  Commonly known as:  TYLENOL     aspirin 81 MG tablet     CALCIUM CARBONATE PO     CENTRUM SILVER Tabs     docusate sodium 50 MG capsule  Commonly known as:  COLACE  Take 1 capsule by mouth 2 times daily     dronedarone hcl 400 MG Tabs  Commonly known as:  MULTAQ     Iron 142 (45 Fe) MG Tbcr  Take 1 tablet by mouth 3 times daily     Lift Chair Misc  by Does not apply route     montelukast 10 MG tablet  Commonly known as:  SINGULAIR  Take 1 tablet by mouth daily     nitroGLYCERIN 0.4 MG SL tablet  Commonly known as:  NITROSTAT     PROBIOTIC DAILY PO     pyrilamine-phenylephrine-dextromethorphan 5-8.33-10 MG/5ML syrup  Commonly known as:  CODIMAL DM  Take 5 mLs by mouth every Keep appt with Dr. Rukhsana Escobedo    Time Spent at discharge 37 minutes    Signed:  Rosemary Florence, ACAGNP-BC  ------------------------------------------------------------------------  I have independently interviewed and examined Corie Cabrera. I have discussed key elements of the care plan with the PA or APRN and I agree with the findings and care plan as stated above unless otherwise noted. Additional Comments:     Chief complaint:  Renal function improved after obstruction complete.   Eating poorly    Objective:  RRR  Lungs clear  +BS soft nontender  Extr warm and dry  Heel lift boots in place  CN intact nonfocal  Very hard of hearing  Skin with many bruises    Impression / Plan:  DC on augmentin  Urinary obstruction follow up with Dr. Rukhsana Escobedo as an outpatient  DC to [facility]    Electronically signed by Calli Goncalves DO on 11/21/2017 at 2:22 PM

## 2017-11-21 NOTE — PROGRESS NOTES
Nutrition Assessment    Type and Reason for Visit: Reassess    Nutrition Recommendations: continue current POC    Malnutrition Assessment:  · Malnutrition Status: Meets the criteria for moderate malnutrition  · Context: Acute illness or injury  · Findings of the 6 clinical characteristics of malnutrition (Minimum of 2 out of 6 clinical characteristics is required to make the diagnosis of moderate or severe Protein Calorie Malnutrition based on AND/ASPEN Guidelines):  1. Energy Intake-Less than or equal to 50%, greater than 7 days    2. Weight Loss-5% loss or greater, in 1 month  3. Fat Loss-Moderate subcutaneous fat loss, Orbital, Triceps  4. Muscle Loss-Moderate muscle mass loss, Temples (temporalis muscle), Clavicles (pectoralis and deltoids)  5. Fluid Accumulation-Unable to assess,    6.  Strength-Not measured    Nutrition Diagnosis:   · Problem: Inadequate oral intake  · Etiology: related to Difficulty swallowing, Cognitive or neurological impairment     Signs and symptoms:  as evidenced by Intake 0-25%, Swallow study results    Nutrition Assessment:  · Subjective Assessment: Discussed pt care with pt son. Pt son stated he has a bridge in his mouth, makes it difficult to eat. PO intake recorded variable, 0 and 25-50%. Aware pt is to be discharged today.   · Nutrition-Focused Physical Findings: thin appearing gentleman  · Wound Type: Skin Tears (redness, excoriation, abrasion, bruising)  · Current Nutrition Therapies:  · Oral Diet Orders: Dysphagia 1 (Pureed), Honey Thick   · Oral Diet intake: 0%, 26-50%  · Oral Nutrition Supplement (ONS) Orders: None  · Anthropometric Measures:  · Ht: 5' 10\" (177.8 cm)   · Current Body Wt: 147 lb (66.7 kg)  · Admission Body Wt: 148 lb (67.1 kg)  · Usual Body Wt: 156 lb (70.8 kg)  · % Weight Change: 5%,  1 month  · Ideal Body Wt: 166 lb (75.3 kg), % Ideal Body 88%  · BMI Classification: BMI 18.5 - 24.9 Normal Weight    Estimated Intake vs Estimated Needs: Intake Less Than Needs    Nutrition Risk Level: High    Nutrition Interventions:   Continue current diet, Continue current ONS  Continued Inpatient Monitoring    Nutrition Evaluation:   · Evaluation: No progress toward goals   · Goals: po intake 50% or greater    · Monitoring: Meal Intake, Supplement Intake, Diet Tolerance, Skin Integrity, Wound Healing, Weight, Pertinent Labs    See Adult Nutrition Doc Flowsheet for more detail.      Electronically signed by Mickey Deras MS, RD, LD on 11/21/17 at 11:00 AM

## 2017-11-21 NOTE — CONSULTS
Inpatient consult to Urology  Consult performed by: Anderson Weeks ordered by: Wang Ruano  Reason for consult: Has indwelling rodriguez catheter urology recommnedations requested for keeping catheter.

## 2017-11-22 LAB
BLOOD CULTURE, ROUTINE: NORMAL
CULTURE, BLOOD 2: NORMAL

## 2017-12-14 ENCOUNTER — CARE COORDINATION (OUTPATIENT)
Dept: CARE COORDINATION | Age: 82
End: 2017-12-14

## 2017-12-15 NOTE — CARE COORDINATION
Pt has been excluded from [facility] program. Permanent resident at a LTC facility.  [facility] 11/21/17
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
